# Patient Record
Sex: FEMALE | Race: WHITE | Employment: FULL TIME | URBAN - METROPOLITAN AREA
[De-identification: names, ages, dates, MRNs, and addresses within clinical notes are randomized per-mention and may not be internally consistent; named-entity substitution may affect disease eponyms.]

---

## 2017-01-12 ENCOUNTER — TELEPHONE (OUTPATIENT)
Dept: ORTHOPEDIC SURGERY | Age: 62
End: 2017-01-12

## 2017-10-18 ENCOUNTER — HOSPITAL ENCOUNTER (OUTPATIENT)
Dept: NON INVASIVE DIAGNOSTICS | Age: 62
Discharge: HOME | End: 2017-10-18
Attending: INTERNAL MEDICINE | Admitting: INTERNAL MEDICINE

## 2017-10-18 DIAGNOSIS — R29.90 UNSPECIFIED SYMPTOMS AND SIGNS INVOLVING THE NERVOUS SYSTEM (CODE): ICD-10-CM

## 2017-10-18 LAB
LV EF: 63 %
LVEF MODALITY: NORMAL

## 2019-06-05 ENCOUNTER — TELEPHONE (OUTPATIENT)
Dept: ORTHOPEDIC SURGERY | Age: 64
End: 2019-06-05

## 2020-05-11 ENCOUNTER — OFFICE VISIT (OUTPATIENT)
Dept: ORTHOPEDIC SURGERY | Age: 65
End: 2020-05-11
Payer: COMMERCIAL

## 2020-05-11 VITALS — BODY MASS INDEX: 28.13 KG/M2 | TEMPERATURE: 97.6 F | HEIGHT: 67 IN | WEIGHT: 179.23 LBS

## 2020-05-11 PROCEDURE — 29075 APPL CST ELBW FNGR SHORT ARM: CPT | Performed by: ORTHOPAEDIC SURGERY

## 2020-05-11 PROCEDURE — 99204 OFFICE O/P NEW MOD 45 MIN: CPT | Performed by: ORTHOPAEDIC SURGERY

## 2020-05-11 NOTE — PROGRESS NOTES
5/11/20  History of Present Illness:  Chaparro Gomez is a 63 y.o. female being seen today for left wrist injury she slipped and fell 10 days ago landing on her left wrist    Chief complaint that brought the patient in the office today: Left wrist pain      Location left wrist  Severity moderate  Duration 10 days  Associated sign/symptoms pain, swelling, loss of motion    I have reviewed and discussed the below Pain assessment findings with the patient. Pain Assessment  Location of Pain: Wrist  Location Modifiers: Left  Severity of Pain: 6  Quality of Pain: Throbbing, Sharp  Duration of Pain: Persistent  Frequency of Pain: Constant  Aggravating Factors: Bending, Stretching, Straightening  Limiting Behavior: Yes  Relieving Factors: Rest  Result of Injury: Yes  Work-Related Injury: No  Are there other pain locations you wish to document?: No    Medical History  Patient's medications, allergies, past medical, surgical, social and family histories were reviewed and updated as appropriate. Past Medical History:   Diagnosis Date    Hypertension     Sleep apnea      No family history on file.   Social History     Socioeconomic History    Marital status:      Spouse name: Not on file    Number of children: Not on file    Years of education: Not on file    Highest education level: Not on file   Occupational History    Not on file   Social Needs    Financial resource strain: Not on file    Food insecurity     Worry: Not on file     Inability: Not on file    Transportation needs     Medical: Not on file     Non-medical: Not on file   Tobacco Use    Smoking status: Never Smoker    Smokeless tobacco: Never Used   Substance and Sexual Activity    Alcohol use: No    Drug use: No    Sexual activity: Not on file   Lifestyle    Physical activity     Days per week: Not on file     Minutes per session: Not on file    Stress: Not on file   Relationships    Social connections     Talks on phone: Not on file Gets together: Not on file     Attends Buddhist service: Not on file     Active member of club or organization: Not on file     Attends meetings of clubs or organizations: Not on file     Relationship status: Not on file    Intimate partner violence     Fear of current or ex partner: Not on file     Emotionally abused: Not on file     Physically abused: Not on file     Forced sexual activity: Not on file   Other Topics Concern    Not on file   Social History Narrative    Not on file     Current Outpatient Medications   Medication Sig Dispense Refill    vitamin D (ERGOCALCIFEROL) 19072 units CAPS capsule Take 50,000 Units by mouth once a week      magnesium 30 MG tablet Take 30 mg by mouth nightly       Multiple Vitamins-Minerals (THERAPEUTIC MULTIVITAMIN-MINERALS) tablet Take 1 tablet by mouth daily      Ascorbic Acid (VITAMIN C) 250 MG tablet Take 250 mg by mouth daily      gabapentin (NEURONTIN) 100 MG capsule Take 100 mg by mouth daily      Cholecalciferol (VITAMIN D3) 3000 units TABS Take 1 tablet by mouth nightly      predniSONE (DELTASONE) 10 MG tablet Days1-2:50mg PO QD,Days3-4:40mg PO QD,Days5-6:30mg PO QD,Days7-8:20mg PO QD,Days 9-10:10mg PO QD 30 tablet 0    meloxicam (MOBIC) 15 MG tablet Take 1 tablet by mouth daily 30 tablet 3    Omega-3 Fatty Acids (FISH OIL BURP-LESS PO) Take by mouth daily       aspirin 81 MG chewable tablet Take 81 mg by mouth daily      gabapentin (NEURONTIN) 300 MG capsule Take 500 mg by mouth nightly       Estradiol 10 MCG TABS Place vaginally nightly       venlafaxine (EFFEXOR-XR) 150 MG XR capsule Take 75 mg by mouth nightly       lisinopril (PRINIVIL;ZESTRIL) 10 MG tablet Take 40 mg by mouth daily       ibuprofen (ADVIL;MOTRIN) 600 MG tablet Take 600 mg by mouth every 6 hours as needed. No current facility-administered medications for this visit.       Allergies   Allergen Reactions    Sulfa Antibiotics     Celecoxib Rash    Hydrochlorothiazide Rash REVIEW OF SYSTEMS:   No rashes today  No new numbness  No tingling  No fevers  No depression  No new onset of pain        Pertinent items are noted in HPI  Review of systems reviewed from Patient History Form dated on 5/11/2020 and available in the patient's chart under the Media tab. Examination:    General Exam:    Vitals: Temperature 97.6 °F (36.4 °C), height 5' 7.01\" (1.702 m), weight 179 lb 3.7 oz (81.3 kg), not currently breastfeeding. Constitutional: Patient is adequately groomed with no evidence of malnutrition  Mental Status: The patient is oriented to time, place and person. The patient's mood and affect are appropriate. Lymphatic: The lymphatic examination bilaterally reveals all areas to be without enlargement or induration. Vascular: Examination reveals no swelling or calf tenderness. Peripheral pulses are palpable and 2+. Neurological: The patient has good coordination. There is no weakness or sensory deficit. Skin:    Head/Neck: inspection reveals no rashes, ulcerations or lesions. Trunk:  inspection reveals no rashes, ulcerations or lesions. Right Lower Extremity: inspection reveals no rashes, ulcerations or lesions. Left Lower Extremity: inspection reveals no rashes, ulcerations or lesions. Wrist Examination  Inspection: Inspection shows some abrasion and ecchymosis    Palpation: Moderate to severe pain along the base of the wrist towards the ulnar side    Range of Motion: She has good range of motion lacking a little bit secondary to discomfort    Strength: Good flexion-extension of her fingers no signs of any tendon injury    Special Tests: Radial ulnar and median nerve function is intact capillary refill is brisk sensation is intact negative Tinel's negative Phalen's at the wrist negative Tinel's at the elbow moderate pain to palpation along the hook of the hamate    Skin: There are no rashes, ulcerations or lesions.     Gait:

## 2020-06-04 ENCOUNTER — OFFICE VISIT (OUTPATIENT)
Dept: ORTHOPEDIC SURGERY | Age: 65
End: 2020-06-04
Payer: COMMERCIAL

## 2020-06-04 VITALS — BODY MASS INDEX: 28.09 KG/M2 | WEIGHT: 179 LBS | HEIGHT: 67 IN | HEART RATE: 97 BPM

## 2020-06-04 PROCEDURE — 99214 OFFICE O/P EST MOD 30 MIN: CPT | Performed by: ORTHOPAEDIC SURGERY

## 2020-06-04 PROCEDURE — L3908 WHO COCK-UP NONMOLDE PRE OTS: HCPCS | Performed by: ORTHOPAEDIC SURGERY

## 2020-06-04 NOTE — PROGRESS NOTES
6/4/20  History of Present Illness:  Jamir Jacobs is a 63 y.o. female    Chief complaint today in the office: Recheck evaluation left hook of the hamate fracture    Location left hand and left knee   Severity improving  Duration several weeks  Associated sign/symptoms lateral knee pain, wrist pain is been casted now for 3-1/2 weeks and happened a week and half prior to coming to see us    Medical History  Patient's medications, allergies, past medical, surgical, social and family histories were reviewed and updated as appropriate. I have reviewed and discussed the below Pain assessment findings with the patient. Pain Assessment  Location of Pain: Knee  Location Modifiers: Left  Severity of Pain: 6  Quality of Pain: Dull, Aching  Duration of Pain: Persistent  Frequency of Pain: Constant  Aggravating Factors: Bending, Standing, Walking, Stairs  Limiting Behavior: Yes  Relieving Factors: Rest  Result of Injury: Yes  Work-Related Injury: No  Are there other pain locations you wish to document?: No    Review of Systems  No new rashes  No numbness  No tingling  No fever  No depression  No new pain patterns  Pertinent items are noted in HPI  Review of systems reviewed from Patient History Form dated on 5/11/2020 and available in the patient's chart under the Media tab. No change in the patient's medical history form. Examination:  General Exam:    Vitals: Pulse 97, height 5' 7\" (1.702 m), weight 179 lb (81.2 kg), not currently breastfeeding. BMI Readings from Last 3 Encounters:   06/04/20 28.04 kg/m²   05/11/20 28.07 kg/m²   09/30/17 28.07 kg/m²     Constitutional: Patient is adequately groomed with no evidence of malnutrition  Mental Status: The patient is alert and oriented to time, place and person. The patient's mood and affect are appropriate. Lymphatic: The lymphatic examination bilaterally reveals all areas to be without enlargement or induration.   Vascular: Examination reveals no swelling or calf

## 2020-06-12 ENCOUNTER — TELEPHONE (OUTPATIENT)
Dept: ORTHOPEDIC SURGERY | Age: 65
End: 2020-06-12

## 2020-06-15 ENCOUNTER — TELEPHONE (OUTPATIENT)
Dept: ORTHOPEDIC SURGERY | Age: 65
End: 2020-06-15

## 2020-06-25 ENCOUNTER — OFFICE VISIT (OUTPATIENT)
Dept: ORTHOPEDIC SURGERY | Age: 65
End: 2020-06-25
Payer: COMMERCIAL

## 2020-06-25 VITALS — WEIGHT: 179.01 LBS | BODY MASS INDEX: 28.1 KG/M2 | HEIGHT: 67 IN | TEMPERATURE: 97.8 F

## 2020-06-25 PROCEDURE — 99214 OFFICE O/P EST MOD 30 MIN: CPT | Performed by: ORTHOPAEDIC SURGERY

## 2020-06-25 NOTE — PROGRESS NOTES
mouth nightly       Multiple Vitamins-Minerals (THERAPEUTIC MULTIVITAMIN-MINERALS) tablet Take 1 tablet by mouth daily      Ascorbic Acid (VITAMIN C) 250 MG tablet Take 250 mg by mouth daily      gabapentin (NEURONTIN) 100 MG capsule Take 100 mg by mouth daily      Cholecalciferol (VITAMIN D3) 3000 units TABS Take 1 tablet by mouth nightly      predniSONE (DELTASONE) 10 MG tablet Days1-2:50mg PO QD,Days3-4:40mg PO QD,Days5-6:30mg PO QD,Days7-8:20mg PO QD,Days 9-10:10mg PO QD 30 tablet 0    meloxicam (MOBIC) 15 MG tablet Take 1 tablet by mouth daily 30 tablet 3    Omega-3 Fatty Acids (FISH OIL BURP-LESS PO) Take by mouth daily       aspirin 81 MG chewable tablet Take 81 mg by mouth daily      gabapentin (NEURONTIN) 300 MG capsule Take 500 mg by mouth nightly       Estradiol 10 MCG TABS Place vaginally nightly       venlafaxine (EFFEXOR-XR) 150 MG XR capsule Take 75 mg by mouth nightly       lisinopril (PRINIVIL;ZESTRIL) 10 MG tablet Take 40 mg by mouth daily       ibuprofen (ADVIL;MOTRIN) 600 MG tablet Take 600 mg by mouth every 6 hours as needed. No current facility-administered medications for this visit. Allergies   Allergen Reactions    Sulfa Antibiotics     Celecoxib Rash    Hydrochlorothiazide Rash       REVIEW OF SYSTEMS:   No rash  No numbness  No tingling  No fever  No depression      Pertinent items are noted in HPI  Review of systems reviewed from Patient History Form dated on 6/4/20 and available in the patient's chart under the Media tab. Examination:    General Exam:    Vitals: Temperature 97.8 °F (36.6 °C), height 5' 7.01\" (1.702 m), weight 179 lb 0.2 oz (81.2 kg), not currently breastfeeding. BMI Readings from Last 3 Encounters:   06/25/20 28.03 kg/m²   06/04/20 28.04 kg/m²   05/11/20 28.07 kg/m²     Constitutional: Patient is adequately groomed with no evidence of malnutrition  Mental Status:  The patient is oriented to time, relocation are negative. Anterior and posterior glide are equal bilaterally. Negative sulcus sign. No signs of any significant multidirectional instability. There is no scapular winging. There is no muscle atrophy of the latissimus dorsi, the deltoid, the periscapular musculature, the trapezius musculature or the pectoralis musculature. Negative Neer's test, negative Ahuja test, no pain with crossarm elevation. Abduction --150 degrees  Flexion-- 180 degrees  Extension-- between 45-60 degrees  Latera/external rotation --close to 90 degrees  Medial/ internal rotation -- between 70-90 degree    CERVICAL SPINE  EXAMINATION:  · Inspection: Local inspection shows no step-off or bruising. Cervical alignment is normal. No instability is noted. · Palpation and Percussion: No evidence of tenderness at the midline. Paraspinal tenderness is not present. There is no paraspinal spasm. · Range of Motion:  pain-free ROM   · Strength: 5/5 bilateral upper extremities. · Special Tests:   Spurling's and Acuna's are negative bilaterally. Ahuja and Impingement tests are negative bilaterally. · Skin:There are no rashes, ulcerations or lesions. · Reflexes: Bilaterally triceps, biceps and brachioradialis are 2+. Clonus absent bilaterally at the feet. No pathological reflexes are noted.   · Gait & station:  normal, patient ambulates without assistance and no ataxia      Cranial nerve exam:    1- smell-- patient states no Olfactory problem  2- visual acuity is intact  3- moves eyes, and pupils are reactive  4- extra-ocular muscles are intact  5- facial sensation is intact no muscle atrophy  6- extra ocular muscles are intact  7- mouth moist and facial expressions are intact  8- good hearing and no difficulty with recognition  9- patient has no difficulty swallowing  10- no difficulty breathing and no Gastrointestinal problems good cough   11- moves head with all motion and no swallowing problems  12- normal speech and

## 2020-07-06 ENCOUNTER — OFFICE VISIT (OUTPATIENT)
Dept: ORTHOPEDIC SURGERY | Age: 65
End: 2020-07-06
Payer: COMMERCIAL

## 2020-07-06 VITALS — HEIGHT: 67 IN | TEMPERATURE: 98.6 F | BODY MASS INDEX: 28.1 KG/M2 | WEIGHT: 179.01 LBS

## 2020-07-06 PROBLEM — M17.10 PATELLOFEMORAL ARTHRITIS: Status: ACTIVE | Noted: 2020-07-06

## 2020-07-06 PROBLEM — S83.232A COMPLEX TEAR OF MEDIAL MENISCUS OF LEFT KNEE AS CURRENT INJURY: Status: ACTIVE | Noted: 2020-07-06

## 2020-07-06 PROCEDURE — 99214 OFFICE O/P EST MOD 30 MIN: CPT | Performed by: ORTHOPAEDIC SURGERY

## 2020-07-06 NOTE — PROGRESS NOTES
7/6/20  History of Present Illness:  Cookie Danrell is a 63 y.o. female    Chief complaint today in the office: Recheck evaluation left knee here today to discuss options    Location left knee   Severity moderate  Duration 4 weeks  Associated sign/symptoms pain, swelling, loss of motion    Medical History  Patient's medications, allergies, past medical, surgical, social and family histories were reviewed and updated as appropriate. Review of Systems  No new rashes  No numbness  No tingling  No fever  No depression  No new pain patterns  Pertinent items are noted in HPI  Review of systems reviewed from Patient History Form dated on 7/1/2020 and available in the patient's chart under the Media tab. No change in the patient's medical history form. Examination:  General Exam:    Vitals: Temperature 98.6 °F (37 °C), height 5' 7.01\" (1.702 m), weight 179 lb 0.2 oz (81.2 kg), not currently breastfeeding. BMI Readings from Last 3 Encounters:   07/06/20 28.03 kg/m²   06/25/20 28.03 kg/m²   06/04/20 28.04 kg/m²     Constitutional: Patient is adequately groomed with no evidence of malnutrition  Mental Status: The patient is alert and oriented to time, place and person. The patient's mood and affect are appropriate. Lymphatic: The lymphatic examination bilaterally reveals all areas to be without enlargement or induration. Vascular: Examination reveals no swelling or calf tenderness. Peripheral pulses are palpable and 2+. Neurological: The patient has good coordination. There is no weakness or sensory deficit. Skin:    Head/Neck: inspection reveals no rashes, ulcerations or lesions. Trunk:  inspection reveals no rashes, ulcerations or lesions. Right Lower Extremity: inspection reveals no rashes, ulcerations or lesions. Left Lower Extremity: inspection reveals no rashes, ulcerations or lesions.                                       PHYSICAL EXAM:      Knee Examination  Inspection:  No abrasions no lacerations no signs of infection or obvious deformity moderate obvious swelling and joint effusion. Palpation:   Palpation reveals moderate pain medial joint line,   Moderate lateral joint line pain, moderate joint effusion. Range of Motion: 0-150 degrees flexion/ extension   Hip extension to 20 hip flexion to 70+  Lumbar ROM -20 extension flexion to 6 inches from the floor. Strength: Quadriceps testing 5/5, hamstring muscle testing 5/5, EHL against resistance is 5/5, hip flexor strength is intact 5/5, internal and external rotation of the hip against resistance is also intact 5/5. Special Tests: stable Lachman, negative anterior drawer, negative pivot shift, no posterior sag no posterior drawer does not open to valgus or varus stress at 0 or 30°, Steinmann's is a, Allen's positive, Homans negative Eugenio negative, pedal pulses are +2/4 capillary refill is brisk sensation is intact ankle exam and hip exam are shows no pain with full range of motion provocative testing of the hip is nonpainful muscle testing around the hip is 5 over 5. Lumbar flexion to 6 inches from floor without pain. Gait: antalgic     Reflex: Intact  Lower extremity Deep tendon reflexes +2/4 and equal bilaterally for patella and Achilles. Upper extremity reflexes:  of the biceps, triceps, brachioradialis +2/4 equal bilaterally. Contralateral  Knee: Negative Lachman negative anterior drawer negative pivot shift no posterior sag no posterior drawer does not open to valgus or varus stress at 0 or 30° negative Steinmann's negative Allen's negative Homans negative Eugenio pedal pulses are +2/4 capillary refill is brisk sensation is intact ankle exam and hip exam are shows no pain with full range of motion provocative testing of the hip is nonpainful muscle testing around the hip is 5 over 5. Hip and lumbar testing does not reproduce pain evocative testing does not reproduce symptomatology.       Additional Examinations:  Right Upper Extremity:  Examination of the right upper extremity does not show any tenderness, deformity or injury. Range of motion is unremarkable. There is no gross instability. There are no rashes, ulcerations or lesions. Strength and tone are normal.  Left Upper Extremity: Examination of the left upper extremity does not show any tenderness, deformity or injury. Range of motion is unremarkable. There is no gross instability. There are no rashes, ulcerations or lesions. Strength and tone are normal.  Lower Back: Examination of the lower back does not show any tenderness, deformity or injury. Range of motion is unremarkable. There is no gross instability. There are no rashes, ulcerations or lesions. Strength and tone are normal.    Past Surgical History:   Procedure Laterality Date    JOINT REPLACEMENT      KNEE ARTHROSCOPY      right    SHOULDER ARTHROSCOPY Right 8/14/15    WITH SUBACROMIAL DECOMPRESSION, DISTAL CLAVICLE EXCISION, ROTATOR CUFF REPAIR    WRIST SURGERY Right september 2014    WRIST SURGERY Right february 2015    WRIST SURGERY Left 2012   . Radiology:     X-rays reviewed in office:  I independently reviewed the films in the office today. Views MRI multiple views  Body Part left knee  Impression. Meniscus tear, lateral meniscus tear, patellofemoral chondromalacia    Xr Hand Left (min 3 Views)    Result Date: 6/25/2020  Radiology exam is complete. No Radiologist dictation. Please follow up with ordering provider. Mri Lower Extremity W Jt Wo Contrast    Result Date: 7/1/2020  Site: [facility] #: [id number] #: [id number] Procedure: MR Left Knee w/o Contrast ; Reason for Exam: Left knee pain, rule out medial meniscus tear This document is confidential medical information. Unauthorized disclosure or use of this information is prohibited by law. If you are not the intended recipient of this document, please advise us by calling immediately [phone]. [facility], [address] [place] [address] Patient Name: Valeria Palcaio Case ID: [id number] Patient : 1955 Referring Physician: Bobby Costa DO Exam Date: 2020 Exam Description: MR Left Knee w/o Contrast HISTORY:  Left knee pain. Evaluate for medial meniscus tear. TECHNICAL FACTORS:  Long- and short-axis fat- and water-weighted images were performed. FINDINGS:  High-grade patellofemoral chondromalacia. Extensor mechanism intact. Small effusion. Mild swelling/contusion in the Hoffa fat pad. Low-grade proximal popliteus tendon strain. Fibular collateral ligament and distal biceps femoris tendon intact. Mild swelling or low-grade sprain/grade 1 injury of the MCL complex. Contusion/strain with the suggestion of a 3.5 x 3 x 3.5 mm interstitial tear noted at the tendinous origin of the medial head of the gastrocnemius (sagittal PD fat-sat series 7 image 24). Mild pes anserine bursitis. ACL and PCL intact. Mild spurring at the tibial eminences. An approximately 2 cm long thin inner edge flap tear in the posterior horn of the medial meniscus. Approximately 6 mm inner edge blunting/tear in the posterior horn of the lateral meniscus (sagittal PD fat-sat series 7 images 9 and 10). Intact medial and lateral compartment  articular cartilage. CONCLUSION: 1. 2 cm thin inner edge flap tear in the posterior horn of the medial meniscus. 2. 6 mm inner edge blunting/tear in the posterior horn of the lateral meniscus. 3. Grade 1 MCL injury/mild sprain. 4. Contusion/strain with the suggestion of a 3.5 x 3 x 3.5 mm interstitial tear at the tendinous origin of the medial head of the gastrocnemius. 5. High-grade patellofemoral chondromalacia. Small effusion. Thank you for the opportunity to provide your interpretation. Christopher Murphy MD A: HS/ct 2020 6:08 PM T: CT 2020 5:57 PM         Impression: Medial meniscus tear, lateral meniscus tear, patellofemoral chondromalacia    Office Procedures:  No orders of the defined types were placed in this encounter. Differential Diagnoses: Medial meniscus tear, lateral meniscus tear, patellofemoral chondromalacia, infection, contusion, knee pathology, Muscle injury, bone tumor or stress fracture. Possible other diagnoses: Same as a differential diagnosis      Plan (Medical Decision Making):    I discussed the diagnosis and the treatment options with Paulo Gonzalez today. In Summary:  The various treatment options were outlined and discussed with Paulo Gonzalez including:  Conservative care options: physical therapy, ice, medications, bracing, and activity modification. The indications for therapeutic injections. The indications for additional imaging/laboratory studies. The indications for (possible future) interventions. After considering the various options discussed, Paulo Gonzalez elected to pursue a course of treatment that includes the followin. Medications: No further recommendations for new medications. 2. PT:  Prescribed home exercise program.    3. Further studies: No further studies. 4. Interventional:  We discussed pursuing I spent 15+ minutes, face to face, with the patient discussing and answering questions regarding the risks, benefits, and complications of arthroscopic knee surgery. The patient realizes that there are concerns with this surgery with respect to infection, deep vein thrombosis, neurological injury, delayed  rehabilitation, the possibility of arthrofibrosis of the knee, and specifically  Hoffa's fat pad fibrosis that can potentially cause difficulties. The patient realizes that there are also anesthetic concerns including cardiopulmonary issues, pulmonary issues, and even possibility of death or dystrophy. The patient voiced understanding to this as well as the normal  rehabilitation  that   is involved with weeks of physical therapy, exercise, and strengthening.  The patient also realizes that even though the surgery, from a functional perspective, typically allows the patient to return to good function at about 6 weeks, that it often takes 6 months to completely rehabilitate from this operation. The patient also realizes that if there is an arthritic component to the symptoms, then they may still have some degree of arthritis pain. .  Radiologic imaging and symptoms confirm the pain etiology. Risks, benefits and alternatives of interventional options were discussed. These include and are not limited to bleeding, infection, spinal headache, nerve injury, increased pain and lack of pain relief. The patient verbalized understanding and would like to proceed. The patient will be scheduled accordingly    5. Healthy Lifestyle Measures:  Patient education material reviewing the following was distributed to Juan Butler  Anatomic drawings  Healthy lifestyle education  Advanced imaging preparedness    Proper lifting and carrying techniques,   Weight management discussed  Quitting smoking      6. Follow up:  after surgery      Juan Butler was instructed to call the office if her symptoms worsen or if new symptoms appear prior to the next scheduled visit. She is specifically instructed to contact the office between now & her scheduled appointment if she has concerns related to her condition or if she needs assistance in scheduling the above tests. She is   welcome to call for an appointment sooner if she has any additional concerns or questions. Navin Ross DO    [facility]  Sports Fellowship Trained  Board Certified  [employer] Team Physician      Disclaimer: \"This note was dictated with voice recognition software. Though review and correction are routine, we apologize for any errors. \"

## 2020-07-16 RX ORDER — MELOXICAM 15 MG/1
15 TABLET ORAL DAILY
Qty: 90 TABLET | Refills: 3 | Status: SHIPPED | OUTPATIENT
Start: 2020-07-21

## 2020-07-16 RX ORDER — HYDROCODONE BITARTRATE AND ACETAMINOPHEN 5; 325 MG/1; MG/1
1 TABLET ORAL EVERY 6 HOURS PRN
Qty: 28 TABLET | Refills: 0 | Status: SHIPPED | OUTPATIENT
Start: 2020-07-21 | End: 2020-07-28

## 2020-07-17 ENCOUNTER — OFFICE VISIT (OUTPATIENT)
Dept: PRIMARY CARE CLINIC | Age: 65
End: 2020-07-17
Payer: COMMERCIAL

## 2020-07-17 PROCEDURE — 99211 OFF/OP EST MAY X REQ PHY/QHP: CPT | Performed by: FAMILY MEDICINE

## 2020-07-17 NOTE — PROGRESS NOTES
Keli Holman received a viral test for COVID-19. They were educated on isolation and quarantine as appropriate. For any symptoms, they were directed to seek care from their PCP, given contact information to establish with a doctor, directed to an urgent care or the emergency room. Patient was seen today for pre op Covid testing.

## 2020-07-17 NOTE — PROGRESS NOTES
Name_______________________________________Printed:____________________  Date and time of [id number]_______________________Fzbnrpv Time:__1415 MASC______________   1. The instructions given regarding when and if a patient needs to stop oral intake prior to surgery varies. Follow the specific instructions you were given                  __X_Nothing to eat or to drink after Midnight the night before.                             ____Endoscopy patient follow your DRS instructions-generally you will be doing a part of the prep after Midnight                   ____Carbo loading or ERAS instructions will be given to select patients-if you have been given those instructions -please do the following                           The evening before your surgery after dinner before midnight drink 40 ounces of gatorade. If you are diabetic use sugar free. The morning of surgery drink 40 ounces of water. This needs to be finished 3 hours prior to your surgery start time. 2. Take the following pills with a small sip of water on the morning of surgery___NONE________________________________________________                  Do not take blood pressure medications ending in pril or sartan the micaela prior to surgery or the morning of surgery_   3. Aspirin, Ibuprofen, Advil, Naproxen, Vitamin E and other Anti-inflammatory products and supplements should be stopped for 5 -7days before surgery or as directed by your physician. 4. Check with your Doctor regarding stopping Plavix, Coumadin,Eliquis, Lovenox,Effient,Pradaxa,Xarelto, Fragmin or other blood thinners and follow their instructions. 5. Do not smoke, and do not drink any alcoholic beverages 24 hours prior to surgery. This includes NA Beer. Refrain from the usage of any recreational drugs. 6. You may brush your teeth and gargle the morning of surgery. DO NOT SWALLOW WATER   7.  You MUST make arrangements for a responsible adult to stay on site while you are here and take you home after your surgery. You will not be allowed to leave alone or drive yourself home. It is strongly suggested someone stay with you the first 24 hrs. Your surgery will be cancelled if you do not have a ride home. 8. A parent/legal guardian must accompany a child scheduled for surgery and plan to stay at the hospital until the child is discharged. Please do not bring other children with you. 9. Please wear simple, loose fitting clothing to the hospital.  Ramu Salter not bring valuables (money, credit cards, checkbooks, etc.) Do not wear any makeup (including no eye makeup) or nail polish on your fingers or toes. 10. DO NOT wear any jewelry or piercings on day of surgery. All body piercing jewelry must be removed. 11. If you have ___dentures, they will be removed before going to the OR; we will provide you a container. If you wear ___contact lenses or ___glasses, they will be removed; please bring a case for them. 12. Please see your family doctor/pediatrician for a history & physical and/or concerning medications. Bring any test results/reports from your physician's office. PCP__________________Phone___________H&P Appt. Date________             13 If you  have a Living Will and Durable Power of  for Healthcare, please bring in a copy. 15. Notify your Surgeon if you develop any illness between now and surgery  time, cough, cold, fever, sore throat, nausea, vomiting, etc.  Please notify your surgeon if you experience dizziness, shortness of breath or blurred vision between now & the time of your surgery             15. DO NOT shave your operative site 96 hours prior to surgery. For face & neck surgery, men may use an electric razor 48 hours prior to surgery. 16. Shower the night before or morning of surgery using an antibacterial soap or as you have been instructed.              17. To provide excellent care visitors will be limited to one in the room at any given time. 18.  Please bring picture ID and insurance card. 19.  Visit our web site for additional information:  [email or web address]              20.During flu season no children under the age of 15 are permitted in the hospital for the safety of all patients. 21. If you take a long acting insulin in the evening only  take half of your usual  dose the night  before your procedure              22. If you use a c-pap please bring DOS if staying overnight,             23.For your convenience [facility] has a pharmacy on site to fill your prescriptions. 24. If you use oxygen and have a portable tank please bring it  with you the DOS             25. Bring a complete list of all your medications with name and dose include any supplements. 26. Other__Patient instructed to get their COVID-19 test done as directed by their doctor (5-7 days prior to procedure)  or patient states will get on __________. I The day the COVID test is done is considered day one. Instructed to self quarantine after test until DOS. _______There is a one visitor policy at [facility] for the pre-op phase only for surgery and endoscopy cases. The visitor is expected to leave the facility after the patient is taken back for the procedure. Pain management is NO VISITOR policyThe patients ride is expected to remain in the car with a cell phone for communication. If the ride is leaving the hospital grounds please make sure they are back in time for pickup. Have the patient inform the staff on arrival what their rides plans are while the patient is in the facility. At the MAIN there is one visitor allowed. Please note that the visitor policy is subject to change._________________________________   *Please call pre admission testing if you any further questions   Adilene Montaño [address]    [phone]  Francesco Robertson  [phone]   [facility] [address]ANDT [phone]       All above information reviewed with patient in person or by phone. Patient verbalizes understanding. All questions and concerns addressed.                                                                                                  Patient/Rep____________________                                                                                                                                    PRE OP INSTRUCTIONS

## 2020-07-17 NOTE — PROGRESS NOTES
PT INSISTING THAT SHE CAN'T GO WITHOUT DRINKING WATER FROM MIDNIGHT. DR Ashley Pierre SAID SHE CAN HAVE CLEAR LIQUIDS UNTIL 10 AM ON DOS UNLESS HER SURGERY TIME CHANGES. SHE MUST BE NPO FOR 6 HOURS. PT NOTIFIED RE SAME. SHE VERBALIZED UNDERSTANDING.

## 2020-07-17 NOTE — PATIENT INSTRUCTIONS

## 2020-07-20 ENCOUNTER — TELEPHONE (OUTPATIENT)
Dept: ORTHOPEDIC SURGERY | Age: 65
End: 2020-07-20

## 2020-07-20 LAB — SARS-COV-2: NOT DETECTED

## 2020-07-20 NOTE — TELEPHONE ENCOUNTER
Stevesuman Cooper [id number]    Date: 07/21/2020  Type of SX:  OP  Location: [facility]  CPT: 37118   DX Code: K29.065V  SX area: Left knee scope  Insurance: [email or web address]

## 2020-07-21 ENCOUNTER — HOSPITAL ENCOUNTER (OUTPATIENT)
Age: 65
Setting detail: OUTPATIENT SURGERY
Discharge: HOME | End: 2020-07-21
Attending: ORTHOPAEDIC SURGERY | Admitting: ORTHOPAEDIC SURGERY
Payer: COMMERCIAL

## 2020-07-21 ENCOUNTER — ANESTHESIA (OUTPATIENT)
Dept: OPERATING ROOM | Age: 65
End: 2020-07-21
Payer: COMMERCIAL

## 2020-07-21 ENCOUNTER — ANESTHESIA EVENT (OUTPATIENT)
Dept: OPERATING ROOM | Age: 65
End: 2020-07-21
Payer: COMMERCIAL

## 2020-07-21 VITALS
HEART RATE: 71 BPM | SYSTOLIC BLOOD PRESSURE: 156 MMHG | DIASTOLIC BLOOD PRESSURE: 79 MMHG | TEMPERATURE: 97.4 F | BODY MASS INDEX: 26.57 KG/M2 | HEIGHT: 66 IN | RESPIRATION RATE: 12 BRPM | OXYGEN SATURATION: 97 % | WEIGHT: 165.31 LBS

## 2020-07-21 VITALS
SYSTOLIC BLOOD PRESSURE: 117 MMHG | RESPIRATION RATE: 14 BRPM | DIASTOLIC BLOOD PRESSURE: 55 MMHG | OXYGEN SATURATION: 100 %

## 2020-07-21 PROCEDURE — 7100000000 HC PACU RECOVERY - FIRST 15 MIN: Performed by: ORTHOPAEDIC SURGERY

## 2020-07-21 PROCEDURE — 3700000001 HC ADD 15 MINUTES (ANESTHESIA): Performed by: ORTHOPAEDIC SURGERY

## 2020-07-21 PROCEDURE — 7100000011 HC PHASE II RECOVERY - ADDTL 15 MIN: Performed by: ORTHOPAEDIC SURGERY

## 2020-07-21 PROCEDURE — 3600000004 HC SURGERY LEVEL 4 BASE: Performed by: ORTHOPAEDIC SURGERY

## 2020-07-21 PROCEDURE — 6360000002 HC RX W HCPCS: Performed by: NURSE ANESTHETIST, CERTIFIED REGISTERED

## 2020-07-21 PROCEDURE — 2709999900 HC NON-CHARGEABLE SUPPLY: Performed by: ORTHOPAEDIC SURGERY

## 2020-07-21 PROCEDURE — 6370000000 HC RX 637 (ALT 250 FOR IP): Performed by: ANESTHESIOLOGY

## 2020-07-21 PROCEDURE — 2720000010 HC SURG SUPPLY STERILE: Performed by: ORTHOPAEDIC SURGERY

## 2020-07-21 PROCEDURE — 2580000003 HC RX 258: Performed by: ANESTHESIOLOGY

## 2020-07-21 PROCEDURE — 2500000003 HC RX 250 WO HCPCS: Performed by: NURSE ANESTHETIST, CERTIFIED REGISTERED

## 2020-07-21 PROCEDURE — 7100000010 HC PHASE II RECOVERY - FIRST 15 MIN: Performed by: ORTHOPAEDIC SURGERY

## 2020-07-21 PROCEDURE — 6360000002 HC RX W HCPCS: Performed by: ORTHOPAEDIC SURGERY

## 2020-07-21 PROCEDURE — 2580000003 HC RX 258: Performed by: ORTHOPAEDIC SURGERY

## 2020-07-21 PROCEDURE — 3700000000 HC ANESTHESIA ATTENDED CARE: Performed by: ORTHOPAEDIC SURGERY

## 2020-07-21 PROCEDURE — 3600000014 HC SURGERY LEVEL 4 ADDTL 15MIN: Performed by: ORTHOPAEDIC SURGERY

## 2020-07-21 PROCEDURE — 2500000003 HC RX 250 WO HCPCS: Performed by: ORTHOPAEDIC SURGERY

## 2020-07-21 PROCEDURE — 7100000001 HC PACU RECOVERY - ADDTL 15 MIN: Performed by: ORTHOPAEDIC SURGERY

## 2020-07-21 RX ORDER — MEPERIDINE HYDROCHLORIDE 25 MG/ML
12.5 INJECTION INTRAMUSCULAR; INTRAVENOUS; SUBCUTANEOUS EVERY 5 MIN PRN
Status: DISCONTINUED | OUTPATIENT
Start: 2020-07-21 | End: 2020-07-21 | Stop reason: HOSPADM

## 2020-07-21 RX ORDER — ONDANSETRON 2 MG/ML
4 INJECTION INTRAMUSCULAR; INTRAVENOUS
Status: DISCONTINUED | OUTPATIENT
Start: 2020-07-21 | End: 2020-07-21 | Stop reason: HOSPADM

## 2020-07-21 RX ORDER — FENTANYL CITRATE 50 UG/ML
INJECTION, SOLUTION INTRAMUSCULAR; INTRAVENOUS PRN
Status: DISCONTINUED | OUTPATIENT
Start: 2020-07-21 | End: 2020-07-21 | Stop reason: SDUPTHER

## 2020-07-21 RX ORDER — PROPOFOL 10 MG/ML
INJECTION, EMULSION INTRAVENOUS PRN
Status: DISCONTINUED | OUTPATIENT
Start: 2020-07-21 | End: 2020-07-21 | Stop reason: SDUPTHER

## 2020-07-21 RX ORDER — ONDANSETRON 2 MG/ML
INJECTION INTRAMUSCULAR; INTRAVENOUS PRN
Status: DISCONTINUED | OUTPATIENT
Start: 2020-07-21 | End: 2020-07-21 | Stop reason: SDUPTHER

## 2020-07-21 RX ORDER — HYDRALAZINE HYDROCHLORIDE 20 MG/ML
5 INJECTION INTRAMUSCULAR; INTRAVENOUS EVERY 10 MIN PRN
Status: DISCONTINUED | OUTPATIENT
Start: 2020-07-21 | End: 2020-07-21 | Stop reason: HOSPADM

## 2020-07-21 RX ORDER — VIT C/B6/B5/MAGNESIUM/HERB 173 50-5-6-5MG
1 CAPSULE ORAL
COMMUNITY

## 2020-07-21 RX ORDER — HYDROCODONE BITARTRATE AND ACETAMINOPHEN 5; 325 MG/1; MG/1
1 TABLET ORAL
Status: COMPLETED | OUTPATIENT
Start: 2020-07-21 | End: 2020-07-21

## 2020-07-21 RX ORDER — OXYCODONE HYDROCHLORIDE AND ACETAMINOPHEN 5; 325 MG/1; MG/1
1 TABLET ORAL
Status: DISCONTINUED | OUTPATIENT
Start: 2020-07-21 | End: 2020-07-21 | Stop reason: HOSPADM

## 2020-07-21 RX ORDER — DEXAMETHASONE SODIUM PHOSPHATE 4 MG/ML
INJECTION, SOLUTION INTRA-ARTICULAR; INTRALESIONAL; INTRAMUSCULAR; INTRAVENOUS; SOFT TISSUE PRN
Status: DISCONTINUED | OUTPATIENT
Start: 2020-07-21 | End: 2020-07-21 | Stop reason: SDUPTHER

## 2020-07-21 RX ORDER — CHLORAL HYDRATE 500 MG
1000 CAPSULE ORAL 3 TIMES DAILY
COMMUNITY

## 2020-07-21 RX ORDER — HYDROMORPHONE HCL 110MG/55ML
0.5 PATIENT CONTROLLED ANALGESIA SYRINGE INTRAVENOUS EVERY 5 MIN PRN
Status: DISCONTINUED | OUTPATIENT
Start: 2020-07-21 | End: 2020-07-21 | Stop reason: HOSPADM

## 2020-07-21 RX ORDER — PROPOFOL 10 MG/ML
INJECTION, EMULSION INTRAVENOUS CONTINUOUS PRN
Status: DISCONTINUED | OUTPATIENT
Start: 2020-07-21 | End: 2020-07-21 | Stop reason: SDUPTHER

## 2020-07-21 RX ORDER — LABETALOL HYDROCHLORIDE 5 MG/ML
5 INJECTION, SOLUTION INTRAVENOUS EVERY 10 MIN PRN
Status: DISCONTINUED | OUTPATIENT
Start: 2020-07-21 | End: 2020-07-21 | Stop reason: HOSPADM

## 2020-07-21 RX ORDER — LIDOCAINE HYDROCHLORIDE 20 MG/ML
INJECTION, SOLUTION EPIDURAL; INFILTRATION; INTRACAUDAL; PERINEURAL PRN
Status: DISCONTINUED | OUTPATIENT
Start: 2020-07-21 | End: 2020-07-21 | Stop reason: SDUPTHER

## 2020-07-21 RX ORDER — SODIUM CHLORIDE 9 MG/ML
INJECTION, SOLUTION INTRAVENOUS CONTINUOUS
Status: DISCONTINUED | OUTPATIENT
Start: 2020-07-21 | End: 2020-07-21 | Stop reason: HOSPADM

## 2020-07-21 RX ADMIN — FENTANYL CITRATE 25 MCG: 50 INJECTION, SOLUTION INTRAMUSCULAR; INTRAVENOUS at 16:14

## 2020-07-21 RX ADMIN — FENTANYL CITRATE 50 MCG: 50 INJECTION, SOLUTION INTRAMUSCULAR; INTRAVENOUS at 16:04

## 2020-07-21 RX ADMIN — ONDANSETRON 4 MG: 2 INJECTION INTRAMUSCULAR; INTRAVENOUS at 16:10

## 2020-07-21 RX ADMIN — LIDOCAINE HYDROCHLORIDE 40 MG: 20 INJECTION, SOLUTION EPIDURAL; INFILTRATION; INTRACAUDAL; PERINEURAL at 16:04

## 2020-07-21 RX ADMIN — PROPOFOL 150 MCG/KG/MIN: 10 INJECTION, EMULSION INTRAVENOUS at 16:04

## 2020-07-21 RX ADMIN — FENTANYL CITRATE 25 MCG: 50 INJECTION, SOLUTION INTRAMUSCULAR; INTRAVENOUS at 16:21

## 2020-07-21 RX ADMIN — CEFAZOLIN SODIUM 2 G: 10 INJECTION, POWDER, FOR SOLUTION INTRAVENOUS at 15:49

## 2020-07-21 RX ADMIN — DEXAMETHASONE SODIUM PHOSPHATE 4 MG: 4 INJECTION, SOLUTION INTRAMUSCULAR; INTRAVENOUS at 16:10

## 2020-07-21 RX ADMIN — HYDROCODONE BITARTRATE AND ACETAMINOPHEN 1 TABLET: 5; 325 TABLET ORAL at 17:26

## 2020-07-21 RX ADMIN — SODIUM CHLORIDE: 9 INJECTION, SOLUTION INTRAVENOUS at 15:01

## 2020-07-21 RX ADMIN — PROPOFOL 50 MG: 10 INJECTION, EMULSION INTRAVENOUS at 16:04

## 2020-07-21 ASSESSMENT — PULMONARY FUNCTION TESTS
PIF_VALUE: 0
PIF_VALUE: 1
PIF_VALUE: 0
PIF_VALUE: 1
PIF_VALUE: 0
PIF_VALUE: 1
PIF_VALUE: 1
PIF_VALUE: 0
PIF_VALUE: 0
PIF_VALUE: 1
PIF_VALUE: 0
PIF_VALUE: 1
PIF_VALUE: 0
PIF_VALUE: 0
PIF_VALUE: 1
PIF_VALUE: 0
PIF_VALUE: 0
PIF_VALUE: 1
PIF_VALUE: 0
PIF_VALUE: 2
PIF_VALUE: 1
PIF_VALUE: 0
PIF_VALUE: 1

## 2020-07-21 ASSESSMENT — PAIN SCALES - GENERAL: PAINLEVEL_OUTOF10: 5

## 2020-07-21 ASSESSMENT — PAIN DESCRIPTION - DESCRIPTORS: DESCRIPTORS: ACHING

## 2020-07-21 ASSESSMENT — PAIN - FUNCTIONAL ASSESSMENT
PAIN_FUNCTIONAL_ASSESSMENT: 0-10
PAIN_FUNCTIONAL_ASSESSMENT: PREVENTS OR INTERFERES SOME ACTIVE ACTIVITIES AND ADLS

## 2020-07-21 ASSESSMENT — PAIN DESCRIPTION - ORIENTATION: ORIENTATION: LEFT

## 2020-07-21 ASSESSMENT — PAIN DESCRIPTION - LOCATION: LOCATION: KNEE

## 2020-07-21 ASSESSMENT — PAIN DESCRIPTION - PAIN TYPE: TYPE: SURGICAL PAIN

## 2020-07-21 NOTE — PROGRESS NOTES
This RN verified with [facility] pharmacist, that patient is able to safely take prescribed meloxicam with history of celecoxib allergy.     Electronically signed by Obinna Orona RN on 7/21/2020 at 5:25 PM

## 2020-07-21 NOTE — ANESTHESIA PRE PROCEDURE
HYDROcodone-acetaminophen (NORCO) 5-325 MG per tablet Take 1 tablet by mouth every 6 hours as needed for Pain for up to 7 days. 28 tablet 0    magnesium 30 MG tablet Take 30 mg by mouth nightly       Multiple Vitamins-Minerals (THERAPEUTIC MULTIVITAMIN-MINERALS) tablet Take 1 tablet by mouth daily      Ascorbic Acid (VITAMIN C) 250 MG tablet Take 250 mg by mouth daily      Cholecalciferol (VITAMIN D3) 3000 units TABS Take 1 tablet by mouth nightly      predniSONE (DELTASONE) 10 MG tablet Days1-2:50mg PO QD,Days3-4:40mg PO QD,Days5-6:30mg PO QD,Days7-8:20mg PO QD,Days 9-10:10mg PO QD (Patient not taking: Reported on 7/17/2020) 30 tablet 0    venlafaxine (EFFEXOR-XR) 150 MG XR capsule Take 75 mg by mouth nightly       lisinopril (PRINIVIL;ZESTRIL) 10 MG tablet Take 20 mg by mouth daily       ibuprofen (ADVIL;MOTRIN) 600 MG tablet Take 600 mg by mouth every 6 hours as needed. Allergies:     Allergies   Allergen Reactions    Amoxicillin-Pot Clavulanate Diarrhea    Celecoxib Rash    Hydrochlorothiazide Rash    Sulfa Antibiotics Rash     celebrex caused a rash       Problem List:    Patient Active Problem List   Diagnosis Code    Sprain of wrist A38.105O    Fracture of triquetrum of right wrist, closed S62.111A    Closed fracture of distal end of ulna (alone) S52.609A    Articular cartilage disorder of forearm M24.139    Ganglion of joint M67.40    Localized superficial swelling, mass, or lump R22.9    Shoulder pain M25.519    Rotator cuff tear M75.100    Rotator cuff (capsule) sprain S43.429A    Complete tear of right rotator cuff M75.121    Right shoulder pain M25.511    Transient cerebral ischemia G45.9    Essential hypertension I10    Obstructive sleep apnea G47.33    Patellofemoral arthritis M17.10    Complex tear of medial meniscus of left knee as current injury S84.626H       Past Medical History:        Diagnosis Date    Hypertension     Sleep apnea     uses cpap       Past Surgical History:        Procedure Laterality Date    JOINT REPLACEMENT      KNEE ARTHROSCOPY      right    SHOULDER ARTHROSCOPY Right 8/14/15    WITH SUBACROMIAL DECOMPRESSION, DISTAL CLAVICLE EXCISION, ROTATOR CUFF REPAIR    WRIST SURGERY Right september 2014    WRIST SURGERY Right february 2015    WRIST SURGERY Left 2012       Social History:    Social History     Tobacco Use    Smoking status: Never Smoker    Smokeless tobacco: Never Used   Substance Use Topics    Alcohol use: No     Comment: rare                                Counseling given: Not Answered      Vital Signs (Current):   Vitals:    07/17/20 1041   Weight: 165 lb (74.8 kg)   Height: 5' 6\" (1.676 m)                                              BP Readings from Last 3 Encounters:   09/30/17 128/77   08/14/15 141/67   06/22/15 100/70       NPO Status:                                                                                 BMI:   Wt Readings from Last 3 Encounters:   07/17/20 165 lb (74.8 kg)   07/06/20 179 lb 0.2 oz (81.2 kg)   06/25/20 179 lb 0.2 oz (81.2 kg)     Body mass index is 26.63 kg/m². CBC:   Lab Results   Component Value Date    WBC 8.1 09/29/2017    RBC 4.29 09/29/2017    HGB 13.4 09/29/2017    HCT 39.9 09/29/2017    MCV 92.9 09/29/2017    RDW 12.8 09/29/2017     09/29/2017       CMP:   Lab Results   Component Value Date     09/29/2017    K 4.0 09/29/2017     09/29/2017    CO2 27 09/29/2017    BUN 16 09/29/2017    CREATININE 0.6 09/29/2017    GFRAA >60 09/29/2017    LABGLOM >60 09/29/2017    GLUCOSE 108 09/29/2017    CALCIUM 9.8 09/29/2017       POC Tests: No results for input(s): POCGLU, POCNA, POCK, POCCL, POCBUN, POCHEMO, POCHCT in the last 72 hours.     Coags: No results found for: PROTIME, INR, APTT    HCG (If Applicable):   Lab Results   Component Value Date    PREGTESTUR Neg 09/20/2010        ABGs: No results found for: PHART, PO2ART, YEK6PTP, ZGE8VGY, BEART, I9VKWCQN     Type & Screen (If Applicable):  No results found for: LABABO, LABRH    Drug/Infectious Status (If Applicable):  No results found for: HIV, HEPCAB    COVID-19 Screening (If Applicable):   Lab Results   Component Value Date    COVID19 NOT DETECTED 07/17/2020         Anesthesia Evaluation  Patient summary reviewed and Nursing notes reviewed  Airway: Mallampati: II  TM distance: >3 FB   Neck ROM: full  Mouth opening: > = 3 FB Dental:          Pulmonary:   (+) sleep apnea: on CPAP,                             Cardiovascular:  Exercise tolerance: good (>4 METS),   (+) hypertension: moderate,                   Neuro/Psych:               GI/Hepatic/Renal:             Endo/Other:                     Abdominal:           Vascular:                                        Anesthesia Plan      general     ASA 1     (Pt on prednisone)  Induction: intravenous and rapid sequence. MIPS: Postoperative opioids intended, Prophylactic antiemetics administered and Postoperative trial extubation. Anesthetic plan and risks discussed with patient. Plan discussed with CRNA.                   Marco Aguilera MD   7/21/2020

## 2020-07-21 NOTE — PROGRESS NOTES
Patient up to bathroom to urinate, tolerated well. Patient has crutches and two prescriptions.  Enma Cummings, son in law, taking patient home in stable condition    Electronically signed by Francie Melendez RN on 7/21/2020 at 3240

## 2020-07-21 NOTE — PROGRESS NOTES
Patient's awake and alert. On room air. NSR on the monitor. VSS. drsg to left leg CDI. Skin warm, brisk cap refill, pedal pulse palpable. Pain tolerable, rating 5/10. Patient tolerating PO, denies nausea. Patient meets criteria to be discharged from phase 1.  Will prepare for discharge home in phase 2    Electronically signed by Obinna Orona RN on 7/21/2020 at 1700

## 2020-07-21 NOTE — PROGRESS NOTES
Discharge instructions went over with patient and patient's daughter, Maria Guadalupe Flaherty    Electronically signed by Kenyon Shrestha RN on 7/21/2020 at 5:43 PM

## 2020-07-21 NOTE — OP NOTE
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       Procedure(s):  LEFT KNEE ARTHROSCOPE, PARTIAL MEDIAL AND LATERAL MENISCECTOMY VERSUS REPAIR AND INDICATED PROCEDURES    Surgeon(s):  Pasquale Espinoza DO    Assistant:   * No surgical staff found *    Anesthesia: Monitor Anesthesia Care    Estimated Blood Loss (mL): Minimal    Complications: None    Specimens:   * No specimens in log *    Implants:  * No surgical log found *      Drains: * No LDAs found *    Findings: Medial and lateral meniscus tears, synovitis, osteoarthritis    Detailed Description of Procedure:   Left knee diagnostic arthroscopy with partial medial meniscectomy, partial lateral meniscectomy, synovectomy, chondroplasty    Electronically signed by Pasquale Espinoza DO on 7/21/2020 at 10:04 AM                               [address], [address] [place] [address]       Operative note by  Mary Barnett. Juan Maldonado MS, DO  Orthopedic surgeon  Orthopedics sports Fellowship trained  Board-certified  Team Physician for [employer]                       Knee Arthroscopy      Patient Name:  Briana Weaver    YOB: 1955    Medical  Record number: [id number]    Account number: [de-identified]     Date Of Surgery: 7/21/2020    Date Of Dictation: 7/21/2020    Location: [address]    Surgeon: Dr. Oliver Sicard    Assistant: None    Anesthesia: Local with General    Indications:  Chronic pain not alleviated by conservative therapy, positive MRI, not improved with conservative care    Complications: None    Estimated blood loss: Minimal    Preoperative antibiotics: Given and documented in the chart        Preoperative diagnosis :  1.  Left knee medial meniscus tear  2. Left knee lateral meniscus tear            Postoperative diagnosis :  1.  Left knee medial meniscus tear  2. Left knee lateral meniscus tear  3. Left knee hyperemic hypertrophic synovitis  4. Left knee chondromalacia the patella, trochlea, medial femoral condyle          Procedure performed:  1. Left knee diagnostic arthroscopy  2. Left knee arthroscopic partial medial meniscectomy  3. Left knee arthroscopic partial lateral meniscectomy  4. Left knee hyperemic hypertrophic synovectomy   5.  left knee chondroplasty the patella, trochlea, medial femoral condyle        A separate arthroscopy of the medial joint space compartment (which included the medial femoral condyle the medial tibial plateau and medial meniscus ) procedure performed was synovectomy, partial medial meniscectomy and chondroplasty. A separate arthroscopy of the lateral joint space compartment (which includes the the lateral femoral condyle the lateral tibial plateau the lateral meniscus popliteus and portions of the anterior cruciate ligament ) procedure performed was synovectomy, partial lateral meniscectomy. A separate arthroscopy of the anterior joint space compartment (which included the anterior fat pad anterior to the medial and lateral compartment anterior to the medial and lateral gutter anterior to the intercondylar notch inferior to the patella trochlea articulation ) with procedure performed synovectomy. A separate arthroscopy of the posterior joint space compartment (behind the ACL, behind the PCL, posterior joint space behind the medial meniscus and posterior joint space behind the lateral meniscus including the likely area of a Baker's cyst ) with procedure performed synovectomy.     A separate arthroscopy of the superior patella pouch compartment (which includes the superior patellar pouch medial to the medial gutter and superior to the medial gutter lateral to the lateral gutter and superior to the lateral gutter from the patella trochlea area all the way to the top of the knee joint )  with procedure performed synovectomy. A separate arthroscopy of the medial gutter compartment (from the anterior medial joint space medial to the medial joint space anterior to the posterior joint space and below the superior patellar pouch and trochlea patella area.  )  With procedure performed synovectomy. A separate arthroscopy of the lateral gutter compartment with procedure performed synovectomy. A separate arthroscopy of the intercondylar notch compartment with procedure performed synovectomy chondroplasty. A separate arthroscopy of the patella and trochlea articulation compartment and area with procedure performed synovectomy. Procedure in detail:  Patient was seen and evaluated A history and physical was obtained a written consent was discussed and signed by the patient. Following the anesthesia evaluation and discussion with the patient about the scheduled procedure and recovery along with postoperative follow-up plans the patient was brought back to the operative suite put on the operative table in the supine position. At this point the patient was given a MAC anesthetic. I personally scrubbed the knee with alcohol and Betadine and injected the joint with 60 mL total 30 mL of Marcaine 30 mL of lidocaine the lidocaine did have epinephrine using an 18-gauge 1-1/2 inch needle. The knee was put through a full range of motion and tested for stability with Lachman and anterior drawer pivot shift I also performed a posterior drawer assessed stability to valgus and varus stress at 0 and 30°.        Following the evaluation and injection the knee was scrubbed with DuraPrep from upper thigh where we had put a U drape on the way through the foot. Then using sterile technique we draped the leg in a sterile manner. The inferior lateral portal was made using a sharp scalpel and a blunt trocar. The camera was slipped into the cannula suction and irrigation was hoped and started on the cannula. Once the fluid was running we could see the joint very nicely it was irrigated copiously to remove all loose debris and get a nice clear 4 K picture. A diagnostic arthroscopy was performed to evaluate the superior patellar pouch, the medial gutter, the lateral gutter, anterior medial joint space the anterior lateral joint space both meniscuses were probed medially and laterally using the figure-of-four for the lateral joint space using the lateral bar for the medial joint space the ACL and PCL were probed and the chondral surface was evaluated and probed. The camera was initially put in the lateral portal and a shaver was brought into the medial portal using the shaver I remove the synovial tissue from the superior patellar pouch and then used the bipolar to cauterize any bleeding tissue. From the same portal I remove the synovium from the medial gutter patella trochlear area the anterior medial joint space part of the anterior lateral joint space in the intertrochlear area using both the shaver and the bipolar. I now changed the camera to the medial portal using the shaver in the lateral portal I remove the remainder of the synovium from the anterior joint space the anterior lateral joint space in the lateral gutter all the way back up to the superior patellar pouch and across into the patella trochlear area once this was all excised we used the bipolar to smooth off any tissue and cauterize any bleeding tissue. The chondral surface had loose hanging debris. A chondroplasty was performed.    The chondral surface was was trimmed with a shaver down to a stable surface and then I used

## 2020-07-21 NOTE — ANESTHESIA POSTPROCEDURE EVALUATION
Department of Anesthesiology  Postprocedure Note    Patient: Landon Goldmann  MRN: [id number]  YOB: 1955  Date of evaluation: 7/21/2020  Time:  4:44 PM     Procedure Summary     Date:  07/21/20 Room / Location:  [address]    Anesthesia Start:  1601 Anesthesia Stop:      Procedure:  LEFT KNEE ARTHROSCOPY, PARTIAL MEDIAL AND LATERAL MENISCECTOMY,SYNOVECTOMY, CHONDROPLASTY PATELLA (Left Knee) Diagnosis:  (Y93.986D  LEFT KNEE MEDIAL MENISCUS TEAR)    Surgeon:  Bibi De Guzman DO Responsible Provider:  Ar Shirley MD    Anesthesia Type:  general ASA Status:  1          Anesthesia Type: general    Cristo Phase I: Cristo Score: 10    Cristo Phase II:      Last vitals: Reviewed and per EMR flowsheets.        Anesthesia Post Evaluation    Patient location during evaluation: PACU  Patient participation: complete - patient participated  Level of consciousness: awake  Airway patency: patent  Nausea & Vomiting: no vomiting and no nausea  Complications: no  Cardiovascular status: hemodynamically stable  Respiratory status: acceptable  Hydration status: stable

## 2020-07-21 NOTE — PROGRESS NOTES
Patient arrived from OR to PACU. On room air. NSR on the monitor. VSS. drsg to left leg CDI. Skin warm, brisk cap refill, pedal pulse palpable. Ice applied.     Electronically signed by Tu Venegas RN on 7/21/2020 at 6192

## 2020-08-06 ENCOUNTER — OFFICE VISIT (OUTPATIENT)
Dept: ORTHOPEDIC SURGERY | Age: 65
End: 2020-08-06

## 2020-08-06 VITALS — WEIGHT: 165 LBS | TEMPERATURE: 97.5 F | BODY MASS INDEX: 26.52 KG/M2 | HEIGHT: 66 IN

## 2020-08-06 PROCEDURE — 99024 POSTOP FOLLOW-UP VISIT: CPT | Performed by: ORTHOPAEDIC SURGERY

## 2020-08-06 NOTE — PROGRESS NOTES
8/6/20  HISTORY OF PRESENT ILLNESS:   S/P Knee Arthroscopy  The Patient returns today for their postoperative visit after 7/21/2020 left knee arthroscopy. Pain control has been satisfactory with oral medications. There have been no fevers or chills. PHYSICAL EXAMINATION: Left knee  Inspection reveals expected swelling. Portal sites are clean and dry. The skin is warm. Range of motion is not limited by pain and swelling. There is no calf pain  Homans sign is negative. Examination of the contralateral knee reveals warm skin, range of motion within normal limits, good quadriceps bulk, tone and strength, no tenderness to palpation, stable cruciate and collateral ligaments, and no joint line tenderness. Examination of the Patients Lumbar spine reveals the skin is warm and dry. There is no swelling, warmth, or erythema. Range of motion is within normal limits. There is no paraspinal or spinous process tenderness. Ipsilateral and contralateral straight leg raising tests are negative. The distal neurovascular exam is grossly intact and symmetric. ASSESSMENT/PLAN:   The patient is doing well after knee arthroscopy. I have recommended ice, judicious use of NSAIDs, and physical therapy to diminish swelling and restore both motion and strength. I cautioned against overusing the knee, and they will schedule a reevaluation in 4 to 6 weeks  They verbalized good understanding of the plan. Pedro Allen D.O. [address] [place] [address] and Sports Medicine  Sports Fellowship Trained  Board Certified  [employer] Team Physician      Disclaimer: \"This note was dictated with voice recognition software. Though review and correction are routine, we apologize for any errors. \"

## 2020-09-03 ENCOUNTER — OFFICE VISIT (OUTPATIENT)
Dept: ORTHOPEDIC SURGERY | Age: 65
End: 2020-09-03

## 2020-09-03 VITALS — WEIGHT: 165 LBS | TEMPERATURE: 97.7 F | HEIGHT: 66 IN | BODY MASS INDEX: 26.52 KG/M2

## 2020-09-03 PROCEDURE — 99024 POSTOP FOLLOW-UP VISIT: CPT | Performed by: ORTHOPAEDIC SURGERY

## 2020-09-03 NOTE — PROGRESS NOTES
9/3/20  HISTORY OF PRESENT ILLNESS:   S/P Knee Arthroscopy  The Patient returns today for their postoperative visit after 7/21/2020 left knee arthroscopy. Pain control has been satisfactory with oral medications. There have been no fevers or chills. PHYSICAL EXAMINATION: Left knee  Inspection reveals expected swelling. Portal sites are clean and dry. The skin is warm. Range of motion is not limited by pain and swelling. There is no calf pain  Homans sign is negative. Examination of the contralateral knee reveals warm skin, range of motion within normal limits, good quadriceps bulk, tone and strength, no tenderness to palpation, stable cruciate and collateral ligaments, and no joint line tenderness. Examination of the Patients Lumbar spine reveals the skin is warm and dry. There is no swelling, warmth, or erythema. Range of motion is within normal limits. There is no paraspinal or spinous process tenderness. Ipsilateral and contralateral straight leg raising tests are negative. The distal neurovascular exam is grossly intact and symmetric. ASSESSMENT/PLAN:   The patient is doing well after knee arthroscopy. I have recommended ice, judicious use of NSAIDs, and physical therapy to diminish swelling and restore both motion and strength. I cautioned against overusing the knee, and they will schedule a reevaluation in a few weeks  They verbalized good understanding of the plan. I would like to set her up for [id number]. GRISEL Allen. [facility]  Sports Fellowship Trained  Board Certified  [employer] Team Physician      Disclaimer: \"This note was dictated with voice recognition software. Though review and correction are routine, we apologize for any errors. \"

## 2020-09-10 ENCOUNTER — TELEPHONE (OUTPATIENT)
Dept: ORTHOPEDIC SURGERY | Age: 65
End: 2020-09-10

## 2020-09-10 NOTE — TELEPHONE ENCOUNTER
09/10/2020 MONOVISC    BILATERAL KNEE. NO AUTHORIZATION REQUIRED. VALID & BILLABLE. CAN BUY& BILL. PER YOHAN @ [facility] REF # [id number].   AP

## 2020-10-08 ENCOUNTER — OFFICE VISIT (OUTPATIENT)
Dept: ORTHOPEDIC SURGERY | Age: 65
End: 2020-10-08
Payer: COMMERCIAL

## 2020-10-08 VITALS — TEMPERATURE: 98.1 F | BODY MASS INDEX: 26.52 KG/M2 | HEIGHT: 66 IN | WEIGHT: 165 LBS

## 2020-10-08 PROCEDURE — 20610 DRAIN/INJ JOINT/BURSA W/O US: CPT | Performed by: ORTHOPAEDIC SURGERY

## 2020-10-08 PROCEDURE — 99024 POSTOP FOLLOW-UP VISIT: CPT | Performed by: ORTHOPAEDIC SURGERY

## 2020-10-08 RX ORDER — BETAMETHASONE SODIUM PHOSPHATE AND BETAMETHASONE ACETATE 3; 3 MG/ML; MG/ML
6 INJECTION, SUSPENSION INTRA-ARTICULAR; INTRALESIONAL; INTRAMUSCULAR; SOFT TISSUE ONCE
Status: COMPLETED | OUTPATIENT
Start: 2020-10-08 | End: 2020-10-08

## 2020-10-08 RX ADMIN — BETAMETHASONE SODIUM PHOSPHATE AND BETAMETHASONE ACETATE 6 MG: 3; 3 INJECTION, SUSPENSION INTRA-ARTICULAR; INTRALESIONAL; INTRAMUSCULAR; SOFT TISSUE at 14:33

## 2021-07-02 NOTE — RESULT ENCOUNTER NOTE
Received patient in stretcher. AOX4. VSS.  Patient denies chest pain, pain, discomfort, shortness of breath, difficulty breathing and any form of distress not noted. Patient oriented to ED area. Plan of care discussed and verbalized understanding. All needs attended. Purposeful proactive hourly rounding in progress. Your test for COVID-19, also known as novel coronavirus, came back negative. No virus was detected from the sample collected. Testing is not 100%. Until your symptoms are fully resolved, you may still be contagious. We recommend that you remain isolated for 7 days minimum or 72 hours after your symptoms have completely resolved, whichever is longer. If you were exposed to a known positive COVID-19 patient, then you must remain isolated for 14 days. If you were tested for a pre-op, then you remain in isolated until your procedure. Continually monitor symptoms. Contact a medical provider if symptoms are worsening. If you have any additional questions, contact your PCP.     For additional information, please visit the Centers for Disease Control and Prevention [email or web address]

## 2024-03-25 RX ORDER — CALCIUM CARBONATE 260MG(650)
TABLET,CHEWABLE ORAL
COMMUNITY

## 2024-03-25 NOTE — PROGRESS NOTES
Patient reached __X__ yes  _____ no   VM instructions left ____ yes   phone number ________                                ____ no-office notified          Date _4/2/24________  Time _1200______  Arrival _1030  hosp-endo_____    Nothing to eat or drink after midnight-follow your doctors prep instructions-this may include taking a second dose of your prep after midnight  Responsible adult 18 or older to stay on site while you are here-drive you home-stay with you after  Follow any instructions your doctors office has given you  Bring a complete list of all your medications and supplements including name,dose,how often taken the day of your procedure  If you normally take the following medications in the morning please do so the AM of your procedure with a small sip of water       Heart,blood pressure,seizure,thyroid or breathing medications-use your inhalers-bring any rescue inhalers with you DOS       DO NOT take blood pressure medications ending in \"laverne\" or \"pril\" the AM of procedure or evening prior-DO NOT TAKE LISINOPRIL  Dr Valiente patients are not to take any medications the AM of surgery  Take half or your normal dose of any long acting insulins the night before your procedure-do not take any diabetic medications the AM of procedure  Follow your doctors instructions regarding stopping or taking  any blood thinners-if you do not have instructions-call them  Any questions call your doctor  Other ____________NONE__________________________________________________      VISITOR POLICY(subject to change)             The current policy is 2 visitors per patient.There are no children allowed.Mask at discretion of facility. Visiting hours are 8a-8p.Overnight visitors will be at the discretion of the nurse. All policies are subject to change.

## 2024-04-02 ENCOUNTER — ANESTHESIA (OUTPATIENT)
Dept: ENDOSCOPY | Age: 69
End: 2024-04-02
Payer: MEDICARE

## 2024-04-02 ENCOUNTER — ANESTHESIA EVENT (OUTPATIENT)
Dept: ENDOSCOPY | Age: 69
End: 2024-04-02
Payer: MEDICARE

## 2024-04-02 ENCOUNTER — HOSPITAL ENCOUNTER (OUTPATIENT)
Age: 69
Setting detail: OUTPATIENT SURGERY
Discharge: HOME | End: 2024-04-02
Attending: SURGERY | Admitting: SURGERY
Payer: MEDICARE

## 2024-04-02 VITALS
BODY MASS INDEX: 27.64 KG/M2 | DIASTOLIC BLOOD PRESSURE: 88 MMHG | SYSTOLIC BLOOD PRESSURE: 147 MMHG | OXYGEN SATURATION: 99 % | WEIGHT: 172 LBS | RESPIRATION RATE: 19 BRPM | HEIGHT: 66 IN | TEMPERATURE: 96.8 F | HEART RATE: 63 BPM

## 2024-04-02 PROCEDURE — 3700000000 HC ANESTHESIA ATTENDED CARE: Performed by: SURGERY

## 2024-04-02 PROCEDURE — 6360000002 HC RX W HCPCS: Performed by: NURSE ANESTHETIST, CERTIFIED REGISTERED

## 2024-04-02 PROCEDURE — 3609010600 HC COLONOSCOPY POLYPECTOMY SNARE/COLD BIOPSY: Performed by: SURGERY

## 2024-04-02 PROCEDURE — 7100000010 HC PHASE II RECOVERY - FIRST 15 MIN: Performed by: SURGERY

## 2024-04-02 PROCEDURE — 7100000001 HC PACU RECOVERY - ADDTL 15 MIN: Performed by: SURGERY

## 2024-04-02 PROCEDURE — 7100000011 HC PHASE II RECOVERY - ADDTL 15 MIN: Performed by: SURGERY

## 2024-04-02 PROCEDURE — 7100000000 HC PACU RECOVERY - FIRST 15 MIN: Performed by: SURGERY

## 2024-04-02 PROCEDURE — 88305 TISSUE EXAM BY PATHOLOGIST: CPT

## 2024-04-02 PROCEDURE — 3700000001 HC ADD 15 MINUTES (ANESTHESIA): Performed by: SURGERY

## 2024-04-02 PROCEDURE — 2500000003 HC RX 250 WO HCPCS: Performed by: NURSE ANESTHETIST, CERTIFIED REGISTERED

## 2024-04-02 PROCEDURE — 3609010300 HC COLONOSCOPY W/BIOPSY SINGLE/MULTIPLE: Performed by: SURGERY

## 2024-04-02 PROCEDURE — 2580000003 HC RX 258: Performed by: SURGERY

## 2024-04-02 PROCEDURE — 2709999900 HC NON-CHARGEABLE SUPPLY: Performed by: SURGERY

## 2024-04-02 RX ORDER — CALCIUM CARBONATE 500 MG/1
1 TABLET, CHEWABLE ORAL 2 TIMES DAILY
COMMUNITY

## 2024-04-02 RX ORDER — SODIUM CHLORIDE 9 MG/ML
INJECTION, SOLUTION INTRAVENOUS CONTINUOUS
Status: DISCONTINUED | OUTPATIENT
Start: 2024-04-02 | End: 2024-04-02 | Stop reason: HOSPADM

## 2024-04-02 RX ORDER — PROPOFOL 10 MG/ML
INJECTION, EMULSION INTRAVENOUS PRN
Status: DISCONTINUED | OUTPATIENT
Start: 2024-04-02 | End: 2024-04-02 | Stop reason: SDUPTHER

## 2024-04-02 RX ORDER — LIDOCAINE HYDROCHLORIDE 20 MG/ML
INJECTION, SOLUTION INFILTRATION; PERINEURAL PRN
Status: DISCONTINUED | OUTPATIENT
Start: 2024-04-02 | End: 2024-04-02 | Stop reason: SDUPTHER

## 2024-04-02 RX ADMIN — LIDOCAINE HYDROCHLORIDE 80 MG: 20 INJECTION, SOLUTION INFILTRATION; PERINEURAL at 12:31

## 2024-04-02 RX ADMIN — PROPOFOL 120 MCG/KG/MIN: 10 INJECTION, EMULSION INTRAVENOUS at 12:32

## 2024-04-02 RX ADMIN — PROPOFOL 80 MG: 10 INJECTION, EMULSION INTRAVENOUS at 12:31

## 2024-04-02 RX ADMIN — PROPOFOL 20 MG: 10 INJECTION, EMULSION INTRAVENOUS at 12:38

## 2024-04-02 RX ADMIN — SODIUM CHLORIDE: 9 INJECTION, SOLUTION INTRAVENOUS at 11:00

## 2024-04-02 ASSESSMENT — ENCOUNTER SYMPTOMS: SHORTNESS OF BREATH: 0

## 2024-04-02 ASSESSMENT — PAIN - FUNCTIONAL ASSESSMENT: PAIN_FUNCTIONAL_ASSESSMENT: 0-10

## 2024-04-02 NOTE — H&P
Subjective:     Doris Alebrto is a 68 y.o. female who was referred for screening colonoscopy    Patient's medications, allergies, past medical, surgical, social and family histories were reviewed and updated as appropriate.  Past medical history:  has a past medical history of Hypertension and Sleep apnea.  Past surgical history:  has a past surgical history that includes Knee arthroscopy; joint replacement; Wrist surgery (Right, 09/01/2014); Wrist surgery (Right, 02/01/2015); Wrist surgery (Left, 01/01/2012); Shoulder arthroscopy (Right, 08/14/2015); Knee arthroscopy (Left, 07/21/2020); and ORIF femur fracture (Right).  Past social history:  reports that she has never smoked. She has never used smokeless tobacco. She reports that she does not drink alcohol and does not use drugs.  Past family history: family history is not on file.    Allergies:   Allergies   Allergen Reactions    Amoxicillin-Pot Clavulanate Diarrhea     Can take keflex    Celecoxib Rash    Hydrochlorothiazide Rash    Sulfa Antibiotics Rash     celebrex caused a rash     Current medications:   Current Facility-Administered Medications:     0.9 % sodium chloride infusion, , IntraVENous, Continuous, Michael Adan MD, Last Rate: 100 mL/hr at 04/02/24 1204, NoRateChange at 04/02/24 1204    Review of Systems  A comprehensive review of systems was negative.      Objective:     /78   Pulse 67   Temp 97 °F (36.1 °C) (Temporal)   Resp 16   Ht 1.676 m (5' 6\")   Wt 78 kg (172 lb)   SpO2 99%   BMI 27.76 kg/m²   General appearance: alert, appears stated age and cooperative  Eyes: conjunctivae/corneas clear. PERRL, EOM's intact. Fundi benign.  Ears: normal TM's and external ear canals both ears  Heart: regular rate and rhythm, S1, S2 normal, no murmur, click, rub or gallop  Abdomen: soft, non-tender; bowel sounds normal; no masses,  no organomegaly  Lungs: clear to auscultation bilaterally  Rectal: normal tone    Plan:     1. Colonoscopy.

## 2024-04-02 NOTE — ANESTHESIA PRE PROCEDURE
Department of Anesthesiology  Preprocedure Note       Name:  Doris Alberto   Age:  68 y.o.  :  1955                                          MRN:  [id number]         Date:  2024      Surgeon: Surgeon(s):  Michael Adan MD    Procedure: Procedure(s):  COLONOSCOPY DIAGNOSTIC    Medications prior to admission:   Prior to Admission medications    Medication Sig Start Date End Date Taking? Authorizing Provider   calcium carbonate (TUMS) 500 MG chewable tablet Take 1 tablet by mouth 2 times daily   Yes Aretha Soriano MD   Vitamin D-Vitamin K (D3 + K2 DOTS PO) Take by mouth daily (with breakfast)   Yes Aretha Soriano MD   Zinc 10 MG LOZG Take by mouth daily (with breakfast)   Yes Aretha Soriano MD   TURMERIC-FISH OIL PO Take by mouth daily (with breakfast)   Yes Aretha Soriano MD   magnesium 30 MG tablet Take 1 tablet by mouth nightly    Aretha Soriano MD   Multiple Vitamins-Minerals (THERAPEUTIC MULTIVITAMIN-MINERALS) tablet Take 1 tablet by mouth daily    Aretha Soriano MD   Ascorbic Acid (VITAMIN C) 250 MG tablet Take 1 tablet by mouth daily    Aretha Soriano MD   venlafaxine (EFFEXOR-XR) 150 MG XR capsule Take 75 mg by mouth nightly     Aretha Soriano MD   lisinopril (PRINIVIL;ZESTRIL) 10 MG tablet Take 4 tablets by mouth daily    Aretha Soriano MD       Current medications:    Current Facility-Administered Medications   Medication Dose Route Frequency Provider Last Rate Last Admin   • 0.9 % sodium chloride infusion   IntraVENous Continuous Michael Adan  mL/hr at 24 1100 New Bag at 24 1100       Allergies:    Allergies   Allergen Reactions   • Amoxicillin-Pot Clavulanate Diarrhea     Can take keflex   • Celecoxib Rash   • Hydrochlorothiazide Rash   • Sulfa Antibiotics Rash     celebrex caused a rash       Problem List:    Patient Active Problem List   Diagnosis Code   • Sprain of wrist S63.509A   • Fracture of

## 2024-04-02 NOTE — PROGRESS NOTES
Pt arrived from endo to PACU bay 4. Reported received from endo staff. Pt minimally arousable to voice. Pt arrives on room air, vitals as charted.

## 2024-04-02 NOTE — PROGRESS NOTES
Reviewed pt problem list, history, H&P and assessment preoperatively.  Scope verified using 2 person system.  Family in waiting room.    Electronically signed by Kim Galvan RN on 4/2/2024 at 12:32 PM

## 2024-04-02 NOTE — PROGRESS NOTES
Discharge instructions review with patient and pt daughter bedside. Pt discharged via wheelchair. Pt discharged with instructions and all belongings. Pt daughter taking stable pt home.

## 2024-04-02 NOTE — OP NOTE
Patient: Doris Alberto  YOB: 1955  MRN: [id number]  [facility]:  [id number]  Provider:  Michael Adan MD    Date of Procedure: 4/2/2024    Pre-Op Diagnosis: Screening for colon cancer    Post-Op Diagnosis: Polyps of the transverse colon and rectum      Procedure colonoscopy up to cecum with snare polypectomy of the transverse colon  Snare polypectomy of the rectum and 2 biopsy polypectomies of the rectum    Surgeon(s):  Michael Adan MD    Anesthesia: Monitor Anesthesia Care    Estimated Blood Loss (mL): Minimal    Detailed Description of Procedure:   The patient was placed in the left lateral position.  Olympus colonoscope was inserted all the way up to the cecum.  Cecum ileocecal valve were within normal limits.  Her prep was moderate.  She had 1.3 cm polyp in the distal transverse colon removed with electrocautery snare.  There were 2 polyps in the rectum measuring about 1.2 cm in size were also removed electrocautery snare.  There were 2 small polyps in the lower rectum removed with biopsy forceps.  She had diverticulosis of the sigmoid colon.  She also had large internal hemorrhoids.  No cancer was seen.  Colon mucosa was normal.    Patient tolerated the procedure well and left the endoscopy room in a satisfactory condition.    Electronically signed by Michael Adan MD on 4/2/2024 at 1:04 PM

## 2024-04-02 NOTE — ANESTHESIA POSTPROCEDURE EVALUATION
Department of Anesthesiology  Postprocedure Note    Patient: Doris Alberto  MRN: [id number]  YOB: 1955  Date of evaluation: 4/2/2024    Procedure Summary       Date: 04/02/24 Room / Location: [place] [address] / [facility]    Anesthesia Start: 1229 Anesthesia Stop: 1311    Procedure: COLONOSCOPY POLYPECTOMY HOT BIOPSY Diagnosis:       Colon cancer screening      (Colon cancer screening [Z12.11])    Surgeons: Michael Adan MD Responsible Provider: Daljit Patel MD    Anesthesia Type: MAC ASA Status: 2            Anesthesia Type: No value filed.    Cristo Phase I: Cristo Score: 10    Cristo Phase II:      Anesthesia Post Evaluation    Patient location during evaluation: PACU  Patient participation: complete - patient participated  Level of consciousness: awake  Airway patency: patent  Nausea & Vomiting: no nausea and no vomiting  Cardiovascular status: hemodynamically stable  Respiratory status: acceptable  Hydration status: stable  Multimodal analgesia pain management approach  Pain management: adequate    No notable events documented.

## 2024-04-02 NOTE — DISCHARGE INSTRUCTIONS
Call Dr. Adan for any problems and to schedule followup [phone].      ENDOSCOPY DISCHARGE INSTRUCTIONS    You may experience some lightheadedness for the next several hours.  Plan on quiet relaxation for the rest of today.  A responsible adult needs to stay with you today.  Because of the medications you received today-do not drive,operate machinery,or sign any contractual agreement for the next 24 hours.  Do not drink any alcoholic beverages or take any unprescribed medications tonight.  Eat bland food and avoid anything greasy or spicy initially-progress to your normal diet gradually.  Diet restrictions as instructed.  You may resume home medications as instructed.  It is not unusual to experience some mild cramping or gas pains, and you may not have a bowel movement for several days.  If you have any of the following problems, notify your physician or return to the hospital emergency room : fever, chills, excessive bleeding, excessive vomiting, difficulty swallowing, uncontrolled pain, increased abdominal distention, shortness of breath or any other problems.  If you had a polyp removed, avoid strenuous activity for 48 hours.Avoid the use of aspirin or related compounds for one week, unless otherwise instructed by your physician.  You may notice a small amount of blood in your next few bowel movements, but if a large amount passes, call your physician.  If you have a sore throat, you may use lozenges or salt water gargles.  If you had biopsy's taken from your procedure call the office for results in 5-7 days.     ANESTHESIA DISCHARGE INSTRUCTIONS    Wear your seatbelt home.  You are under the influence of drugs-do not drink alcohol,drive,operate machinery,or make any important decisions or sign any legal documentsfor 24 hours  A responsible adult needs to be with you for 24 hours.  You may experience lightheadedness,dizziness,or sleepiness following surgery.  Rest at home today- increase activity as

## 2025-05-30 ENCOUNTER — TELEPHONE (OUTPATIENT)
Dept: PULMONOLOGY | Age: 70
End: 2025-05-30

## 2025-05-30 ENCOUNTER — OFFICE VISIT (OUTPATIENT)
Dept: PULMONOLOGY | Age: 70
End: 2025-05-30
Payer: MEDICARE

## 2025-05-30 VITALS
OXYGEN SATURATION: 97 % | BODY MASS INDEX: 29.02 KG/M2 | DIASTOLIC BLOOD PRESSURE: 66 MMHG | HEART RATE: 72 BPM | HEIGHT: 66 IN | SYSTOLIC BLOOD PRESSURE: 118 MMHG | WEIGHT: 180.6 LBS

## 2025-05-30 DIAGNOSIS — R91.1 PULMONARY NODULE: Primary | ICD-10-CM

## 2025-05-30 PROCEDURE — G8400 PT W/DXA NO RESULTS DOC: HCPCS | Performed by: INTERNAL MEDICINE

## 2025-05-30 PROCEDURE — 3074F SYST BP LT 130 MM HG: CPT | Performed by: INTERNAL MEDICINE

## 2025-05-30 PROCEDURE — 1036F TOBACCO NON-USER: CPT | Performed by: INTERNAL MEDICINE

## 2025-05-30 PROCEDURE — 1123F ACP DISCUSS/DSCN MKR DOCD: CPT | Performed by: INTERNAL MEDICINE

## 2025-05-30 PROCEDURE — 1159F MED LIST DOCD IN RCRD: CPT | Performed by: INTERNAL MEDICINE

## 2025-05-30 PROCEDURE — G8419 CALC BMI OUT NRM PARAM NOF/U: HCPCS | Performed by: INTERNAL MEDICINE

## 2025-05-30 PROCEDURE — 1160F RVW MEDS BY RX/DR IN RCRD: CPT | Performed by: INTERNAL MEDICINE

## 2025-05-30 PROCEDURE — 99204 OFFICE O/P NEW MOD 45 MIN: CPT | Performed by: INTERNAL MEDICINE

## 2025-05-30 PROCEDURE — 3078F DIAST BP <80 MM HG: CPT | Performed by: INTERNAL MEDICINE

## 2025-05-30 PROCEDURE — 3017F COLORECTAL CA SCREEN DOC REV: CPT | Performed by: INTERNAL MEDICINE

## 2025-05-30 PROCEDURE — G8427 DOCREV CUR MEDS BY ELIG CLIN: HCPCS | Performed by: INTERNAL MEDICINE

## 2025-05-30 PROCEDURE — 1090F PRES/ABSN URINE INCON ASSESS: CPT | Performed by: INTERNAL MEDICINE

## 2025-05-30 ASSESSMENT — ENCOUNTER SYMPTOMS
NAUSEA: 0
DIARRHEA: 0
CONSTIPATION: 0
SINUS PRESSURE: 0
ABDOMINAL PAIN: 0

## 2025-05-30 NOTE — PROGRESS NOTES
[facility]  Consult Note  MD Nya Carlosnna SARAH Alberto   YOB: 1955    Date of Visit:  5/30/2025    Assessment/Plan:  1. Pulmonary nodule  I reviewed imaging from an outside facility.  My impression is that there was a complex of nodules in the right upper lobe.  Some of this actually looks like it could be cavitary.  While malignancy is not excluded, I am suspicious of an infectious etiology.    I discussed options with the patient including waiting and repeating CT scan in 3 months.  We also discussed the possibility of doing a biopsy.  However, ultimately we decided on getting a PET scan in middle June which would be about a month  from the index scan.    Results of the PET scan will drive further investigation, either biopsy or serial CT scanning.    Follow-up here after PET scan is complete  - PET CT SKULL BASE TO MID THIGH; Future      Chief Complaint   Patient presents with    New Patient     Pulmonary nodule wa seen at [place], patient wants a new pulmonary doctor since [facility]  Patient presents for evaluation of abnormal CT scan of the chest.  She had had a cardiac CT scan due to a strong family history of heart disease.  CT scan revealed a 16mm right upper lobe pulmonary nodule.  The patient is a lifetime non-smoker.  She has had no significant secondary smoke exposure.  She does not vape.  There is no prior history of pulmonary issues including pneumonia or asthma.  She is not having cough, sputum or hemoptysis.  There is no complaint of anorexia or weight loss.    Review of Systems  Review of Systems   Constitutional:  Negative for fatigue and fever.   HENT:  Negative for congestion and sinus pressure.    Eyes:  Negative for visual disturbance.   Cardiovascular:  Negative for chest pain and palpitations.   Gastrointestinal:  Negative for abdominal pain, constipation, diarrhea and nausea.   Genitourinary:

## 2025-05-30 NOTE — TELEPHONE ENCOUNTER
[facility] Pet Scan lvm to speak with Shirley regarding pt, no further details given.

## 2025-06-03 ENCOUNTER — TELEPHONE (OUTPATIENT)
Dept: PULMONOLOGY | Age: 70
End: 2025-06-03

## 2025-06-17 ENCOUNTER — TELEPHONE (OUTPATIENT)
Dept: PULMONOLOGY | Age: 70
End: 2025-06-17

## 2025-06-20 ENCOUNTER — OFFICE VISIT (OUTPATIENT)
Dept: PULMONOLOGY | Age: 70
End: 2025-06-20
Payer: MEDICARE

## 2025-06-20 VITALS
SYSTOLIC BLOOD PRESSURE: 120 MMHG | OXYGEN SATURATION: 98 % | HEIGHT: 66 IN | DIASTOLIC BLOOD PRESSURE: 68 MMHG | WEIGHT: 180.6 LBS | HEART RATE: 67 BPM | BODY MASS INDEX: 29.02 KG/M2

## 2025-06-20 DIAGNOSIS — R91.1 PULMONARY NODULE: Primary | ICD-10-CM

## 2025-06-20 PROCEDURE — 1159F MED LIST DOCD IN RCRD: CPT | Performed by: INTERNAL MEDICINE

## 2025-06-20 PROCEDURE — 1123F ACP DISCUSS/DSCN MKR DOCD: CPT | Performed by: INTERNAL MEDICINE

## 2025-06-20 PROCEDURE — 1036F TOBACCO NON-USER: CPT | Performed by: INTERNAL MEDICINE

## 2025-06-20 PROCEDURE — 3074F SYST BP LT 130 MM HG: CPT | Performed by: INTERNAL MEDICINE

## 2025-06-20 PROCEDURE — G8427 DOCREV CUR MEDS BY ELIG CLIN: HCPCS | Performed by: INTERNAL MEDICINE

## 2025-06-20 PROCEDURE — G8400 PT W/DXA NO RESULTS DOC: HCPCS | Performed by: INTERNAL MEDICINE

## 2025-06-20 PROCEDURE — 3078F DIAST BP <80 MM HG: CPT | Performed by: INTERNAL MEDICINE

## 2025-06-20 PROCEDURE — 3017F COLORECTAL CA SCREEN DOC REV: CPT | Performed by: INTERNAL MEDICINE

## 2025-06-20 PROCEDURE — 99213 OFFICE O/P EST LOW 20 MIN: CPT | Performed by: INTERNAL MEDICINE

## 2025-06-20 PROCEDURE — G8419 CALC BMI OUT NRM PARAM NOF/U: HCPCS | Performed by: INTERNAL MEDICINE

## 2025-06-20 PROCEDURE — 1090F PRES/ABSN URINE INCON ASSESS: CPT | Performed by: INTERNAL MEDICINE

## 2025-06-20 PROCEDURE — 1160F RVW MEDS BY RX/DR IN RCRD: CPT | Performed by: INTERNAL MEDICINE

## 2025-06-20 NOTE — PROGRESS NOTES
[facility]  Consult Note  MD Nya Carlosedgar Alberto   YOB: 1955    Date of Visit:  6/20/2025    Assessment/Plan:  1. Pulmonary nodule          I reviewed CT imaging and PET scan imaging.  Representative images are displayed above  PET scan does reveal some activity within the lesion.  SUV was 2.6.  Notably, the lesion has not changed significantly in size in the 1 month interval.    This still could represent either infection/inflammatory process or malignant process.  Discussed options with the patient including IR biopsy, navigational biopsy and observation.  If observation is chosen, would recommend an antibiotic course and repeat CT scan in about 8 weeks.    The patient wants to consider her choices and call us back with her decision.      Chief Complaint   Patient presents with    Follow-up     Discuss the bronch with ION  Discuss the PET scan       HPI  The presents today along with her daughter to review recent PET scan imaging.  I had seen her previously with the an incidentally discovered pulmonary nodule.  This was noted during a CT angiogram for her heart.  The patient is a lifetime non-smoker with minimal secondary smoke exposure.  She really has not felt like she has had infection.  There has been no cough sputum or fever.  There is no prior pulmonary history.    Review of Systems  No Chest pain, Nausea or vomiting reported      History  I have reviewed past medical, surgical, social and family history. This is documented elsewhere in themedical record.     Physical Exam:  Well developed, well nourished  Alert and oriented  Sclera is clear  No cervical adenopathy  No JVD.  Chest examination is clear.  Cardiac examination reveals regular rate and rhythm without murmur, gallop or rub.  The abdomen is soft, nontender and nondistended.   There is no clubbing, cyanosis or edema of the extremities.  There is no obvious skin rash.  No

## 2025-06-23 ENCOUNTER — TELEPHONE (OUTPATIENT)
Dept: PULMONOLOGY | Age: 70
End: 2025-06-23

## 2025-06-23 NOTE — TELEPHONE ENCOUNTER
Patient called letting us know she is would like to go through with the bronchoscopy.  Patient prefer Harry 27 or 30.   What type of  bronch?

## 2025-06-24 ENCOUNTER — TELEPHONE (OUTPATIENT)
Dept: PULMONOLOGY | Age: 70
End: 2025-06-24

## 2025-06-24 DIAGNOSIS — R91.1 PULMONARY NODULE: Primary | ICD-10-CM

## 2025-06-24 NOTE — TELEPHONE ENCOUNTER
Patient called back and understands bronch will be on Monday June 30 at 11:00  arrival at 10:30.  Needs CT chest done on Thurs June 26.

## 2025-06-25 NOTE — PROGRESS NOTES
Patient Reached:  Yes_X__   No___    Voicemail Instructions Given: Yes___  No___  # Called:       Date: 6/ 30/ 2025      Arrival Time: 9:30 AM      Procedure Time:  11:00 AM      Location: [facility] . [place], [place]      ([facility])      -Nothing to eat or drink after midnight. Including no Gum, candy and mints.    -You may brush your teeth, but do not swallow any water.    -You will need a responsible adult, 18 or older, to stay on site while you are here-drive you home-stay with you after.    -Bring a complete list of all your medications and supplements including name, dose.    -Follow any additional instructions your doctors office has given you. Especially regarding medications.    -If you normally take the following medications in the morning please do so the AM of your procedure with a small sip of water.         *Heart,blood pressure,seizure,thyroid or breathing medications-use your inhalers-bring any rescue inhalers with you DOS         *DO NOT take blood pressure medications ending in  \"laverne\" or \"pril\"  the AM of procedure or evening prior    -Please hold any GLP-1 Medications (Ozempic, Mounjaro, Trulicity, etc.) for 1 Week prior to procedure.    -Please hold any SGLT2 Inhibitors (Invokana, Farxiga, Jardiance, etc.) for 3 Days prior to procedure.    -Take half or your normal dose of any long acting insulins the night before your procedure-do not take any diabetic medications the AM of procedure    -Follow your doctors instructions regarding stopping or taking  any blood thinners-if you do not have instructions-call them    -Dress comfortably. Please bring insurance card and photo ID.    -Any questions call the Pulmonology Office: [phone]    -Additional Instructions:                VISITOR POLICY(subject to change):    The current policy is 2 visitors per patient. There are no children under the age of 14 permitted. Masking not required but advised. Visiting hours are  Ms Azul is a 74 yo F w history of SCZ, Parkinson's dz, hypothyroidism, and multiple psych hospitalizations who presents for AMS. Pt transferred from [facility], where she was found to have fallen and was covered in urine. Pt has been hospitalized in [facility] with occasional stays at [facility] for medical concerns since the start of this year for psychosis. Per [facility] notes, pt appears to be awaiting transfer to [facility] hospital. Pt has been delusional at [facility] with recent delusions surrounding pregnancy. Current medications are Depakote 750 mg bid, Seroquel 50 mg bid, and Prolixin Dec 12.5 mg IM q2w (last dose 9/24).     In [facility] stroke code was negative for acute pathology. Pt refused MRI and neuro deemed it not necessary. EEG did not reveal seizure activity and toxic encephalopathy workup was also negative. Psychiatry continued Depakote 1000mg BID, Seroquel 75mg QHS and Prolixin Dec 12.5 mg IM q2w (last dose given on 10/9), next dose due 10/23/24. As per psych CL pt has been irritable, aggressive at times requiring PRN medications and restraints. Paranoid and disorganized.     On arrival to  pt continues to present paranoid delusions, mentions she was poisoned by nurses at [facility], continues to endorse delusions of pregnancy. Denies SI/HI. Requires inpatient level of care due to inability to care for herself or transition to outpatient level of care given severe episodes of agitation, aggression and disorganized behavior in the context of psychosis.        10/22 Clinical Update: Ms. Azul refused to have her vital signs taken this morning. No behavioral outbursts or aggression reported. Bloodwork reviewed and WNL. Valproic acid level within therapeutic range at 65.70. Pt is visible in the dayroom today. Seems sedated, dozing off during interview. May need Seroquel dose to be adjusted to minimize daytime sedation. Due for Prolixin dec tomorrow.    10/23: Refused VS, irritable on approach, will offer Prolixin Dec tomorrow if more agreeable.  Did take PO meds.    10/24: Episodic agitation, did require IM prn last night.  Refused SHAFER and declined AM meds.  10/25: Remains irritable, refusing meds and VS, declines SHAFER, will likely require MOO.  10/28 Patient psychotic tenuous will increase Seroquel request transfer hearing start TOO, change prn to Prolixin Benadryl as never has had much response to olanzapine  10/29 Somewhat calmer today possibly from prn and taking Seroquel last night but refused BP meds, and Depakote which may affect medical condition encourage compliance placed MOO paperwork  10/30 Selectively compliant with medications. Remains illogical, paranoid, uncooperative with care  10/31 Psychotic disorganized needing prns taking medical meds part of time. Cont meds plan TOO hearing  11/1 Calmer at moment but quite psychotic, compliance with medical and psych meds very erratic Cont meds and plan for TOO hearing  11/2: Noncompliant with medications, uncooperative with unit rules and ADL care.   11/3: Remains paranoid  11/4 Agitated tenuous poorly compliant will go to court for TOO, will move Seroquel to all PM at her request with hope this may improve compliance  11/5 Somewhat calmer today no prns, very psychotic, erratic compliance. Will offer decanoate today  she is due, plan court for TOO in AM.  11/6 Patient psychotic, not much change will give Prolixin  18.75mg today, Cont to titrate Seroquel but only hs at her request.   11/7 Patient irritable, aggressive delusional. Cont decanoate along with increasing Seroquel and using prns , await state transfer  11/8 Patient irritable more negativistic today do not feel though she has catatonic mutism as she is talking to other staff and peers other than writer. Cont to titrate Seroquel if she is taking regularly. If she refuses Depakote regularly may need to eval for transfer for IV anti convulsants.  11/9 guarded, easily irritable but more visible, no acute events overnight/this am, partially compliant with medical meds. No SI/HI reported.   11/12 guarded, uncooperative, disorganized and irritable, partially cooperative with meds, refuses vitals.   11/13 needed PRN IM and restraints for agitation, physical aggression, calmer later on. Remains delusional, disorganized and labile, partially compliant with medications.  11/14 no overnight events, no PRN needed, refused am meds, complied with hs seroquel. Remains guarded, uncooperative and dismissive, no suicidal/homicidal behavior.   11/15 no acute outbursts, no PRN needed. She does not think seroquel is helpful, only partially compliant with medications, no SI/HI. She spoke to  about abilify, to review her past medications trials.    Plan:  -[facility] 2S under 2PC legal status  -Routine checks  -Bed block  -Continue Depakote 1000mg BID  -Continue Seroquel 150 mg QHS  -Prolixin Dec 18.75 mg received on 11/6/24.  -Neuro workup at [facility] negative for seizure activity or acute stroke

## 2025-06-26 ENCOUNTER — HOSPITAL ENCOUNTER (OUTPATIENT)
Dept: CT IMAGING | Age: 70
Discharge: HOME | End: 2025-06-26
Attending: INTERNAL MEDICINE
Payer: MEDICARE

## 2025-06-26 DIAGNOSIS — R91.1 PULMONARY NODULE: ICD-10-CM

## 2025-06-26 PROCEDURE — 71250 CT THORAX DX C-: CPT

## 2025-06-30 ENCOUNTER — ANESTHESIA (OUTPATIENT)
Dept: ENDOSCOPY | Age: 70
End: 2025-06-30
Payer: MEDICARE

## 2025-06-30 ENCOUNTER — APPOINTMENT (OUTPATIENT)
Dept: GENERAL RADIOLOGY | Age: 70
End: 2025-06-30
Attending: INTERNAL MEDICINE
Payer: MEDICARE

## 2025-06-30 ENCOUNTER — HOSPITAL ENCOUNTER (OUTPATIENT)
Age: 70
Setting detail: OUTPATIENT SURGERY
Discharge: HOME | End: 2025-06-30
Attending: INTERNAL MEDICINE | Admitting: INTERNAL MEDICINE
Payer: MEDICARE

## 2025-06-30 ENCOUNTER — ANESTHESIA EVENT (OUTPATIENT)
Dept: ENDOSCOPY | Age: 70
End: 2025-06-30
Payer: MEDICARE

## 2025-06-30 VITALS
DIASTOLIC BLOOD PRESSURE: 78 MMHG | WEIGHT: 180 LBS | TEMPERATURE: 97.5 F | RESPIRATION RATE: 24 BRPM | OXYGEN SATURATION: 95 % | HEART RATE: 91 BPM | HEIGHT: 66 IN | BODY MASS INDEX: 28.93 KG/M2 | SYSTOLIC BLOOD PRESSURE: 152 MMHG

## 2025-06-30 LAB
APTT BLD: 32.9 SEC (ref 22.8–35.8)
BASOPHILS # BLD: 0 K/UL (ref 0–0.2)
BASOPHILS NFR BLD: 0.7 %
DEPRECATED RDW RBC AUTO: 12.8 % (ref 12.4–15.4)
EOSINOPHIL # BLD: 0.2 K/UL (ref 0–0.6)
EOSINOPHIL NFR BLD: 3.7 %
HCT VFR BLD AUTO: 38.8 % (ref 36–48)
HGB BLD-MCNC: 13.3 G/DL (ref 12–16)
INR PPP: 1 (ref 0.86–1.14)
LYMPHOCYTES # BLD: 1.3 K/UL (ref 1–5.1)
LYMPHOCYTES NFR BLD: 24.3 %
MCH RBC QN AUTO: 31.7 PG (ref 26–34)
MCHC RBC AUTO-ENTMCNC: 34.3 G/DL (ref 31–36)
MCV RBC AUTO: 92.3 FL (ref 80–100)
MONOCYTES # BLD: 0.4 K/UL (ref 0–1.3)
MONOCYTES NFR BLD: 7.2 %
NEUTROPHILS # BLD: 3.4 K/UL (ref 1.7–7.7)
NEUTROPHILS NFR BLD: 64.1 %
PLATELET # BLD AUTO: 239 K/UL (ref 135–450)
PMV BLD AUTO: 7.1 FL (ref 5–10.5)
PROTHROMBIN TIME: 13.5 SEC (ref 12.1–14.9)
RBC # BLD AUTO: 4.2 M/UL (ref 4–5.2)
WBC # BLD AUTO: 5.2 K/UL (ref 4–11)

## 2025-06-30 PROCEDURE — 7100000001 HC PACU RECOVERY - ADDTL 15 MIN: Performed by: INTERNAL MEDICINE

## 2025-06-30 PROCEDURE — C1725 CATH, TRANSLUMIN NON-LASER: HCPCS | Performed by: INTERNAL MEDICINE

## 2025-06-30 PROCEDURE — 2500000003 HC RX 250 WO HCPCS: Performed by: NURSE ANESTHETIST, CERTIFIED REGISTERED

## 2025-06-30 PROCEDURE — 71045 X-RAY EXAM CHEST 1 VIEW: CPT

## 2025-06-30 PROCEDURE — 6360000002 HC RX W HCPCS: Performed by: NURSE ANESTHETIST, CERTIFIED REGISTERED

## 2025-06-30 PROCEDURE — 3700000001 HC ADD 15 MINUTES (ANESTHESIA): Performed by: INTERNAL MEDICINE

## 2025-06-30 PROCEDURE — 2709999900 HC NON-CHARGEABLE SUPPLY: Performed by: INTERNAL MEDICINE

## 2025-06-30 PROCEDURE — 7100000000 HC PACU RECOVERY - FIRST 15 MIN: Performed by: INTERNAL MEDICINE

## 2025-06-30 PROCEDURE — 2580000003 HC RX 258: Performed by: INTERNAL MEDICINE

## 2025-06-30 PROCEDURE — 7100000011 HC PHASE II RECOVERY - ADDTL 15 MIN: Performed by: INTERNAL MEDICINE

## 2025-06-30 PROCEDURE — 3609155400 HC ADD ON COMPUTER ASSISTED NAVIGATION: Performed by: INTERNAL MEDICINE

## 2025-06-30 PROCEDURE — 2720000010 HC SURG SUPPLY STERILE: Performed by: INTERNAL MEDICINE

## 2025-06-30 PROCEDURE — 3700000000 HC ANESTHESIA ATTENDED CARE: Performed by: INTERNAL MEDICINE

## 2025-06-30 PROCEDURE — 85610 PROTHROMBIN TIME: CPT

## 2025-06-30 PROCEDURE — 85730 THROMBOPLASTIN TIME PARTIAL: CPT

## 2025-06-30 PROCEDURE — 2580000003 HC RX 258: Performed by: NURSE ANESTHETIST, CERTIFIED REGISTERED

## 2025-06-30 PROCEDURE — 85025 COMPLETE CBC W/AUTO DIFF WBC: CPT

## 2025-06-30 PROCEDURE — 7100000010 HC PHASE II RECOVERY - FIRST 15 MIN: Performed by: INTERNAL MEDICINE

## 2025-06-30 RX ORDER — SODIUM CHLORIDE 0.9 % (FLUSH) 0.9 %
5-40 SYRINGE (ML) INJECTION PRN
Status: DISCONTINUED | OUTPATIENT
Start: 2025-06-30 | End: 2025-06-30 | Stop reason: HOSPADM

## 2025-06-30 RX ORDER — NALOXONE HYDROCHLORIDE 0.4 MG/ML
INJECTION, SOLUTION INTRAMUSCULAR; INTRAVENOUS; SUBCUTANEOUS PRN
Status: DISCONTINUED | OUTPATIENT
Start: 2025-06-30 | End: 2025-06-30 | Stop reason: HOSPADM

## 2025-06-30 RX ORDER — GLYCOPYRROLATE 0.2 MG/ML
INJECTION INTRAMUSCULAR; INTRAVENOUS
Status: DISCONTINUED | OUTPATIENT
Start: 2025-06-30 | End: 2025-06-30 | Stop reason: SDUPTHER

## 2025-06-30 RX ORDER — DEXAMETHASONE SODIUM PHOSPHATE 4 MG/ML
INJECTION, SOLUTION INTRA-ARTICULAR; INTRALESIONAL; INTRAMUSCULAR; INTRAVENOUS; SOFT TISSUE
Status: DISCONTINUED | OUTPATIENT
Start: 2025-06-30 | End: 2025-06-30 | Stop reason: SDUPTHER

## 2025-06-30 RX ORDER — SODIUM CHLORIDE 0.9 % (FLUSH) 0.9 %
5-40 SYRINGE (ML) INJECTION EVERY 12 HOURS SCHEDULED
Status: DISCONTINUED | OUTPATIENT
Start: 2025-06-30 | End: 2025-06-30 | Stop reason: HOSPADM

## 2025-06-30 RX ORDER — ONDANSETRON 2 MG/ML
INJECTION INTRAMUSCULAR; INTRAVENOUS
Status: DISCONTINUED | OUTPATIENT
Start: 2025-06-30 | End: 2025-06-30 | Stop reason: SDUPTHER

## 2025-06-30 RX ORDER — AMLODIPINE BESYLATE 2.5 MG/1
2.5 TABLET ORAL DAILY
COMMUNITY

## 2025-06-30 RX ORDER — SODIUM CHLORIDE 9 MG/ML
INJECTION, SOLUTION INTRAVENOUS PRN
Status: DISCONTINUED | OUTPATIENT
Start: 2025-06-30 | End: 2025-06-30 | Stop reason: HOSPADM

## 2025-06-30 RX ORDER — LIDOCAINE HYDROCHLORIDE 20 MG/ML
INJECTION, SOLUTION INFILTRATION; PERINEURAL
Status: DISCONTINUED | OUTPATIENT
Start: 2025-06-30 | End: 2025-06-30 | Stop reason: SDUPTHER

## 2025-06-30 RX ORDER — SODIUM CHLORIDE 9 MG/ML
INJECTION, SOLUTION INTRAVENOUS CONTINUOUS
Status: DISCONTINUED | OUTPATIENT
Start: 2025-06-30 | End: 2025-06-30 | Stop reason: HOSPADM

## 2025-06-30 RX ORDER — ONDANSETRON 2 MG/ML
4 INJECTION INTRAMUSCULAR; INTRAVENOUS
Status: DISCONTINUED | OUTPATIENT
Start: 2025-06-30 | End: 2025-06-30 | Stop reason: HOSPADM

## 2025-06-30 RX ORDER — MIDAZOLAM HYDROCHLORIDE 1 MG/ML
INJECTION, SOLUTION INTRAMUSCULAR; INTRAVENOUS
Status: DISCONTINUED | OUTPATIENT
Start: 2025-06-30 | End: 2025-06-30 | Stop reason: SDUPTHER

## 2025-06-30 RX ORDER — FENTANYL CITRATE 50 UG/ML
INJECTION, SOLUTION INTRAMUSCULAR; INTRAVENOUS
Status: DISCONTINUED | OUTPATIENT
Start: 2025-06-30 | End: 2025-06-30 | Stop reason: SDUPTHER

## 2025-06-30 RX ORDER — ROCURONIUM BROMIDE 10 MG/ML
INJECTION, SOLUTION INTRAVENOUS
Status: DISCONTINUED | OUTPATIENT
Start: 2025-06-30 | End: 2025-06-30 | Stop reason: SDUPTHER

## 2025-06-30 RX ORDER — PROPOFOL 10 MG/ML
INJECTION, EMULSION INTRAVENOUS
Status: DISCONTINUED | OUTPATIENT
Start: 2025-06-30 | End: 2025-06-30 | Stop reason: SDUPTHER

## 2025-06-30 RX ADMIN — PHENYLEPHRINE HYDROCHLORIDE 200 MCG: 10 INJECTION INTRAVENOUS at 11:51

## 2025-06-30 RX ADMIN — PHENYLEPHRINE HYDROCHLORIDE 100 MCG: 10 INJECTION INTRAVENOUS at 11:43

## 2025-06-30 RX ADMIN — GLYCOPYRROLATE 0.2 MG: 0.2 INJECTION, SOLUTION INTRAMUSCULAR; INTRAVENOUS at 11:16

## 2025-06-30 RX ADMIN — PHENYLEPHRINE HYDROCHLORIDE 100 MCG: 10 INJECTION INTRAVENOUS at 11:36

## 2025-06-30 RX ADMIN — PHENYLEPHRINE HYDROCHLORIDE 200 MCG: 10 INJECTION INTRAVENOUS at 11:57

## 2025-06-30 RX ADMIN — SUGAMMADEX 200 MG: 100 INJECTION, SOLUTION INTRAVENOUS at 12:37

## 2025-06-30 RX ADMIN — PHENYLEPHRINE HYDROCHLORIDE 50 MCG/MIN: 10 INJECTION INTRAVENOUS at 12:15

## 2025-06-30 RX ADMIN — PROPOFOL 80 MCG/KG/MIN: 10 INJECTION, EMULSION INTRAVENOUS at 11:22

## 2025-06-30 RX ADMIN — ROCURONIUM BROMIDE 50 MG: 10 INJECTION, SOLUTION INTRAVENOUS at 11:18

## 2025-06-30 RX ADMIN — MIDAZOLAM 2 MG: 1 INJECTION INTRAMUSCULAR; INTRAVENOUS at 11:13

## 2025-06-30 RX ADMIN — SODIUM CHLORIDE: 9 INJECTION, SOLUTION INTRAVENOUS at 10:20

## 2025-06-30 RX ADMIN — SODIUM CHLORIDE: 9 INJECTION, SOLUTION INTRAVENOUS at 12:45

## 2025-06-30 RX ADMIN — GLYCOPYRROLATE 0.2 MG: 0.2 INJECTION, SOLUTION INTRAMUSCULAR; INTRAVENOUS at 12:25

## 2025-06-30 RX ADMIN — SUGAMMADEX 100 MG: 100 INJECTION, SOLUTION INTRAVENOUS at 12:45

## 2025-06-30 RX ADMIN — DEXAMETHASONE SODIUM PHOSPHATE 8 MG: 4 INJECTION, SOLUTION INTRAMUSCULAR; INTRAVENOUS at 11:24

## 2025-06-30 RX ADMIN — ROCURONIUM BROMIDE 30 MG: 10 INJECTION, SOLUTION INTRAVENOUS at 12:11

## 2025-06-30 RX ADMIN — ONDANSETRON 4 MG: 2 INJECTION INTRAMUSCULAR; INTRAVENOUS at 11:26

## 2025-06-30 RX ADMIN — PROPOFOL 200 MG: 10 INJECTION, EMULSION INTRAVENOUS at 11:18

## 2025-06-30 RX ADMIN — LIDOCAINE HYDROCHLORIDE 100 MG: 20 INJECTION, SOLUTION INFILTRATION; PERINEURAL at 11:18

## 2025-06-30 RX ADMIN — PHENYLEPHRINE HYDROCHLORIDE 100 MCG: 10 INJECTION INTRAVENOUS at 11:40

## 2025-06-30 RX ADMIN — PROPOFOL 100 MG: 10 INJECTION, EMULSION INTRAVENOUS at 11:25

## 2025-06-30 RX ADMIN — FENTANYL CITRATE 100 MCG: 50 INJECTION, SOLUTION INTRAMUSCULAR; INTRAVENOUS at 11:18

## 2025-06-30 RX ADMIN — PHENYLEPHRINE HYDROCHLORIDE 100 MCG: 10 INJECTION INTRAVENOUS at 11:29

## 2025-06-30 RX ADMIN — PHENYLEPHRINE HYDROCHLORIDE 200 MCG: 10 INJECTION INTRAVENOUS at 12:06

## 2025-06-30 RX ADMIN — PHENYLEPHRINE HYDROCHLORIDE 200 MCG: 10 INJECTION INTRAVENOUS at 11:32

## 2025-06-30 ASSESSMENT — PAIN - FUNCTIONAL ASSESSMENT
PAIN_FUNCTIONAL_ASSESSMENT: 0-10
PAIN_FUNCTIONAL_ASSESSMENT: WONG-BAKER FACES
PAIN_FUNCTIONAL_ASSESSMENT: NONE - DENIES PAIN
PAIN_FUNCTIONAL_ASSESSMENT: NONE - DENIES PAIN

## 2025-06-30 NOTE — PROCEDURES
Bronchoscopy Procedure Note    Date of Operation: 6/30/25  Pre-op Diagnosis: Pulmonary nodule  Post-op Diagnosis: Same  Surgeon: Roni Ríos MD  Anesthesia: General endotracheal anesthesia  Estimate Blood Loss: Minimal   Complications: None    Indications and History:  The patient is 70 y.o. female with PET avid pulmonary nodule in the right middle lobe. The risks, benefits, complications, treatment options and expected outcomes were discussed with the patient. The possibilities of reaction to medication, pulmonary aspiration, perforation of a viscus, bleeding, failure to diagnose a condition and creating a complication requiring transfusion or operation were discussed with the patient who freely signed the consent.     Description of Procedure:   The patient was taken to endoscopy suite, identified as Doris SARAH Alberto and the procedure verified. A time out was held and the above information confirmed.   After the initiation of anesthesia, the patient was placed in appropriate position and the bronchoscope was passed through the ETT . The scope was then passed into the trachea. Lidocaine 2% 3 ml was used topically on the mansi. Careful inspection of the tracheal lumen was accomplished. The scope was sequentially passed into the left main and then left upper and lower bronchi and segmental bronchi.   The scope was then withdrawn and advanced into the right main bronchus and then into the RUL, RML, and RLL bronchi and segmental bronchi.     Endobronchial findings:   Vocal Cords: Normal  Trachea: Normal mucosa   Mansi: Normal mucosa   Right main bronchus: Normal mucosa   Right upper lobe bronchus: Normal mucosa   Right middle lobe bronchus: Normal mucosa   Right lower lobe bronchus: Normal mucosa   Left main bronchus: Normal mucosa   Left upper lobe bronchus: Normal mucosa   Left lower lobe bronchus: Normal mucosa     Using ION computer assisted navigation, the bronchoscope was advanced into position in the

## 2025-06-30 NOTE — ANESTHESIA POSTPROCEDURE EVALUATION
Department of Anesthesiology  Postprocedure Note    Patient: Doris Alberto  MRN: [id number]  YOB: 1955  Date of evaluation: 6/30/2025    Procedure Summary       Date: 06/30/25 Room / Location: [place] [address] / [facility]    Anesthesia Start: 1111 Anesthesia Stop:     Procedures:       BRONCHOSCOPY COMPUTER ASSISTED      BRONCHOSCOPY ENDOBRONCHIAL ULTRASOUND Diagnosis:       Pulmonary nodule      (Pulmonary nodule [R91.1])    Surgeons: Roni Ríos MD Responsible Provider: Yary Jhaveri MD    Anesthesia Type: general ASA Status: 2            Anesthesia Type: No value filed.    Cristo Phase I:      Cristo Phase II:      Anesthesia Post Evaluation    Patient location during evaluation: PACU  Patient participation: complete - patient participated  Level of consciousness: awake  Pain score: 4  Airway patency: patent  Nausea & Vomiting: no nausea and no vomiting  Cardiovascular status: hemodynamically stable  Respiratory status: acceptable  Hydration status: euvolemic  Pain management: adequate    No notable events documented.

## 2025-06-30 NOTE — PROGRESS NOTES
Pt admitted to pre op, pre op teaching complete, questions encouraged and answered. Assessment complete, VSS.

## 2025-06-30 NOTE — PROGRESS NOTES
Reviewed patient's medical and surgical history in electronic record and with patient at the bedside. All questions regarding procedure answered.     Scope number and equipment verified using a two person system. Family in waiting room.    Electronically signed by Stefani Conroy RN on 6/30/2025 at 11:21 AM

## 2025-06-30 NOTE — ADDENDUM NOTE
Addendum  created 06/30/25 1310 by Catherine Weiner APRN - CRNA    Flowsheet accepted, Intraprocedure Flowsheets edited

## 2025-06-30 NOTE — PROGRESS NOTES
Upon discharging the patient, the patient's family asked how the xray looked that Dr. Ríos ordered.  This RN stated that there was no xray ordered, but would follow up with Dr. Ríos to see if an xray was needed. Maria De Jesus Johnston RN reached out to Dr. Ríos, and per Dr. Ríos, an xray is needed but he forgot to put the order in.  Maria De Jesus QUICK put the order in and patient is now awaiting xray.

## 2025-06-30 NOTE — ANESTHESIA PRE PROCEDURE
Department of Anesthesiology  Preprocedure Note       Name:  Doris Alberto   Age:  70 y.o.  :  1955                                          MRN:  [id number]         Date:  2025      Surgeon: Surgeon(s):  Roni Ríos MD    Procedure: Procedure(s):  BRONCHOSCOPY COMPUTER ASSISTED  BRONCHOSCOPY ENDOBRONCHIAL ULTRASOUND    Medications prior to admission:   Prior to Admission medications    Medication Sig Start Date End Date Taking? Authorizing Provider   amLODIPine (NORVASC) 2.5 MG tablet Take 1 tablet by mouth daily   Yes Aretha Soriano MD   Vitamin D-Vitamin K (D3 + K2 DOTS PO) Take by mouth daily (with breakfast)   Yes Aretha Soriano MD   Zinc 10 MG LOZG Take by mouth daily (with breakfast)   Yes Aretha Soriano MD   TURMERIC-FISH OIL PO Take by mouth daily (with breakfast)   Yes Aretha Soriano MD   magnesium 30 MG tablet Take 1 tablet by mouth nightly   Yes Aretha Soriano MD   Multiple Vitamins-Minerals (THERAPEUTIC MULTIVITAMIN-MINERALS) tablet Take 1 tablet by mouth daily   Yes Aretha Soriano MD   Ascorbic Acid (VITAMIN C) 250 MG tablet Take 1 tablet by mouth daily   Yes Aretha Soriano MD   lisinopril (PRINIVIL;ZESTRIL) 40 MG tablet Take 1 tablet by mouth daily   Yes Aretha Soriano MD   calcium carbonate (TUMS) 500 MG chewable tablet Take 1 tablet by mouth 2 times daily  Patient not taking: Reported on 2025    Aretha Soriano MD   venlafaxine (EFFEXOR-XR) 150 MG XR capsule Take 75 mg by mouth nightly   Patient taking differently: Take 50 mg by mouth nightly    Aretha Soriano MD       Current medications:    Current Facility-Administered Medications   Medication Dose Route Frequency Provider Last Rate Last Admin    0.9 % sodium chloride infusion   IntraVENous Continuous Roni Ríos MD           Allergies:    Allergies   Allergen Reactions    Amoxicillin-Pot Clavulanate Diarrhea     Can take keflex

## 2025-06-30 NOTE — DISCHARGE INSTRUCTIONS
BRONCHOSCOPY DISCHARGE INSTRUCTIONS    Return to the ER for painful, persistent shortness of breath.    Do not drive, operate machinery, sign important papers or drink alcohol for 24 hours due to the sedation you received for the procedure.    You may resume your usual diet and medications.     ENDOSCOPY DISCHARGE INSTRUCTIONS    You may experience some lightheadedness for the next several hours.  Plan on quiet relaxation for the rest of today.  A responsible adult needs to stay with you today.  Because of the medications you received today-do not drive,operate machinery,or sign any contractual agreement for the next 24 hours.  Do not drink any alcoholic beverages or take any unprescribed medications tonight.  Eat bland food and avoid anything greasy or spicy initially-progress to your normal diet gradually.  Diet restrictions as instructed.  You may resume home medications as instructed.  If you have any of the following problems, notify your physician or return to the hospital emergency room : fever, chills, excessive bleeding, excessive vomiting, difficulty swallowing, uncontrolled pain, increased abdominal distention, shortness of breath or any other problems.  If you have a sore throat, you may use lozenges or salt water gargles.  If you had a bronchoscopy and you experience sudden or continued shortness of breath, chest pain, spitting up or vomiting blood, notify your physician or return to the hospital emergency room.       ANESTHESIA DISCHARGE INSTRUCTIONS    Wear your seatbelt home.  You are under the influence of drugs-do not drink alcohol,drive,operate machinery,or make any important decisions or sign any legal documentsfor 24 hours  A responsible adult needs to be with you for 24 hours.  You may experience lightheadedness,dizziness,or sleepiness following surgery.  Rest at home today- increase activity as tolerated.  Progress slowly to a regular diet unless your physician has instructed you

## 2025-06-30 NOTE — H&P
The patient was examined today and the H&P from the office was reviewed. There are no changes    Past Medical History:   Diagnosis Date    Hypertension     Sleep apnea     uses cpap       Past Surgical History:   Procedure Laterality Date    COLONOSCOPY N/A 4/2/2024    COLONOSCOPY POLYPECTOMY SNARE/COLD BIOPSY performed by Michael Adan MD at [facility] ENDOSCOPY    COLONOSCOPY  4/2/2024    COLONOSCOPY BIOPSY performed by Michael Adan MD at [facility] ENDOSCOPY    JOINT REPLACEMENT      KNEE ARTHROSCOPY      right    KNEE ARTHROSCOPY Left 07/21/2020    LEFT KNEE ARTHROSCOPY, PARTIAL MEDIAL AND LATERAL MENISCECTOMY,SYNOVECTOMY, CHONDROPLASTY PATELLA performed by Hermes Allen DO at [facility] OR    ORIF FEMUR FRACTURE Right     SHOULDER ARTHROSCOPY Right 08/14/2015    WITH SUBACROMIAL DECOMPRESSION, DISTAL CLAVICLE EXCISION, ROTATOR CUFF REPAIR    WRIST SURGERY Right 09/01/2014    WRIST SURGERY Right 02/01/2015    WRIST SURGERY Left 01/01/2012       VS Reviewed  Chest Clear  Heart regular      Plan: Proceed as planned. Ion Bronchoscopy with needle biopsy and EBUS

## 2025-07-02 ENCOUNTER — TELEPHONE (OUTPATIENT)
Dept: PULMONOLOGY | Age: 70
End: 2025-07-02

## 2025-07-02 DIAGNOSIS — R91.1 PULMONARY NODULE: Primary | ICD-10-CM

## 2025-07-02 NOTE — TELEPHONE ENCOUNTER
----- Message from Salima VIDAL sent at 7/1/2025  8:58 AM EDT -----    ----- Message -----  From: Roni Ríos MD  Sent: 6/30/2025   2:32 PM EDT  To: [facility] Support; #     Put in for IR biopsy of the RML lesion  Then f/u here in two weeks

## 2025-07-02 NOTE — TELEPHONE ENCOUNTER
----- Message from Dr. Roni Ríos MD sent at 6/30/2025  2:32 PM EDT -----   Put in for IR biopsy of the RML lesion  Then f/u here in two weeks

## 2025-07-02 NOTE — TELEPHONE ENCOUNTER
Patient called asking questions.  Is the nodule still there?  Did the CXR show the nodule?  Did the nodule show up on the bronch?  Patient would like to know if a CT lung or chest can be performed before the BX is done just in case the nodule went away.

## 2025-07-03 ENCOUNTER — TELEPHONE (OUTPATIENT)
Dept: PULMONOLOGY | Age: 70
End: 2025-07-03

## 2025-07-07 ENCOUNTER — TELEPHONE (OUTPATIENT)
Dept: PULMONOLOGY | Age: 70
End: 2025-07-07

## 2025-07-07 NOTE — TELEPHONE ENCOUNTER
T[place] from radiology called and said Dr. Rust said the nodule is too small to biopsy.  Dr. Rust [phone] if you have any questions.

## 2025-07-08 ENCOUNTER — OFFICE VISIT (OUTPATIENT)
Dept: PULMONOLOGY | Age: 70
End: 2025-07-08
Payer: MEDICARE

## 2025-07-08 VITALS
OXYGEN SATURATION: 98 % | DIASTOLIC BLOOD PRESSURE: 80 MMHG | BODY MASS INDEX: 29.09 KG/M2 | HEIGHT: 66 IN | SYSTOLIC BLOOD PRESSURE: 146 MMHG | WEIGHT: 181 LBS | HEART RATE: 65 BPM

## 2025-07-08 DIAGNOSIS — R91.1 PULMONARY NODULE: Primary | ICD-10-CM

## 2025-07-08 DIAGNOSIS — J40 BRONCHITIS: ICD-10-CM

## 2025-07-08 PROCEDURE — 3017F COLORECTAL CA SCREEN DOC REV: CPT | Performed by: INTERNAL MEDICINE

## 2025-07-08 PROCEDURE — 1123F ACP DISCUSS/DSCN MKR DOCD: CPT | Performed by: INTERNAL MEDICINE

## 2025-07-08 PROCEDURE — 99213 OFFICE O/P EST LOW 20 MIN: CPT | Performed by: INTERNAL MEDICINE

## 2025-07-08 PROCEDURE — G8419 CALC BMI OUT NRM PARAM NOF/U: HCPCS | Performed by: INTERNAL MEDICINE

## 2025-07-08 PROCEDURE — 3079F DIAST BP 80-89 MM HG: CPT | Performed by: INTERNAL MEDICINE

## 2025-07-08 PROCEDURE — 3077F SYST BP >= 140 MM HG: CPT | Performed by: INTERNAL MEDICINE

## 2025-07-08 PROCEDURE — 1036F TOBACCO NON-USER: CPT | Performed by: INTERNAL MEDICINE

## 2025-07-08 PROCEDURE — G8400 PT W/DXA NO RESULTS DOC: HCPCS | Performed by: INTERNAL MEDICINE

## 2025-07-08 PROCEDURE — 1159F MED LIST DOCD IN RCRD: CPT | Performed by: INTERNAL MEDICINE

## 2025-07-08 PROCEDURE — 1090F PRES/ABSN URINE INCON ASSESS: CPT | Performed by: INTERNAL MEDICINE

## 2025-07-08 PROCEDURE — G8427 DOCREV CUR MEDS BY ELIG CLIN: HCPCS | Performed by: INTERNAL MEDICINE

## 2025-07-08 RX ORDER — DOXYCYCLINE HYCLATE 100 MG
100 TABLET ORAL 2 TIMES DAILY
Qty: 14 TABLET | Refills: 0 | Status: SHIPPED | OUTPATIENT
Start: 2025-07-08 | End: 2025-07-15

## 2025-07-08 NOTE — PROGRESS NOTES
oriented  Sclera is clear  No cervical adenopathy  No JVD.  Chest examination is clear.  Cardiac examination reveals regular rate and rhythm without murmur, gallop or rub.  The abdomen is soft, nontender and nondistended.   There is no clubbing, cyanosis or edema of the extremities.  There is no obvious skin rash.  No focal neuro deficicts  Normal mood and affect      Allergies   Allergen Reactions    Amoxicillin-Pot Clavulanate Diarrhea     Can take keflex    Hydrocodone Headaches    Celecoxib Rash    Hydrochlorothiazide Rash    Sulfa Antibiotics Rash     celebrex caused a rash     Prior to Visit Medications    Medication Sig Taking? Authorizing Provider   doxycycline hyclate (VIBRA-TABS) 100 MG tablet Take 1 tablet by mouth 2 times daily for 7 days Yes Roni Ríos MD   amLODIPine (NORVASC) 2.5 MG tablet Take 1 tablet by mouth daily Yes Aretha Soriano MD   calcium carbonate (TUMS) 500 MG chewable tablet Take 1 tablet by mouth 2 times daily Yes Aretha Soriano MD   Vitamin D-Vitamin K (D3 + K2 DOTS PO) Take by mouth daily (with breakfast) Yes Aretha Soriano MD   Zinc 10 MG LOZG Take by mouth daily (with breakfast) Yes Aretha Soriano MD   TURMERIC-FISH OIL PO Take by mouth daily (with breakfast) Yes Aretha Soriano MD   magnesium 30 MG tablet Take 1 tablet by mouth nightly Yes Aretha Soriano MD   Multiple Vitamins-Minerals (THERAPEUTIC MULTIVITAMIN-MINERALS) tablet Take 1 tablet by mouth daily Yes Aretha Soriano MD   Ascorbic Acid (VITAMIN C) 250 MG tablet Take 1 tablet by mouth daily Yes Aretha Soriano MD   lisinopril (PRINIVIL;ZESTRIL) 40 MG tablet Take 1 tablet by mouth daily Yes Aretha Soriano MD   venlafaxine (EFFEXOR-XR) 150 MG XR capsule Take 75 mg by mouth nightly   Patient taking differently: Take 50 mg by mouth nightly  Aretha Soriano MD       Vitals:    07/08/25 1228   BP: (!) 146/80   BP Site: Right Upper Arm   Patient Position:

## (undated) DEVICE — Device

## (undated) DEVICE — SYRINGE MED 50ML LUERLOCK TIP

## (undated) DEVICE — BANDAGE ADH W1XL3IN NAT FAB WVN FLX DURABLE N ADH PD SEAL

## (undated) DEVICE — SOLUTION IV IRRIG WATER 500ML POUR BRL ST 2F7113

## (undated) DEVICE — SINGLE USE BIOPSY VALVE MAJ-210: Brand: SINGLE USE BIOPSY VALVE (STERILE)

## (undated) DEVICE — Device: Brand: BALLOON

## (undated) DEVICE — SWIVEL CONNECTOR

## (undated) DEVICE — Device: Brand: DISPOSABLE ELECTROSURGICAL SNARE

## (undated) DEVICE — APPLICATOR PREP 26ML 0.7% IOD POVACRYLEX 74% ISO ALC ST

## (undated) DEVICE — Device: Brand: MEDEX

## (undated) DEVICE — BW-412T DISP COMBO CLEANING BRUSH: Brand: SINGLE USE COMBINATION CLEANING BRUSH

## (undated) DEVICE — SHEET,DRAPE,53X77,STERILE: Brand: MEDLINE

## (undated) DEVICE — MACROBORE EXTN SET W/ 1 SMRTSITE NEEDLE-FREE VLV PRT

## (undated) DEVICE — HYPODERMIC SAFETY NEEDLE: Brand: MAGELLAN

## (undated) DEVICE — FORCEPS BX L240CM WRK CHN 2.8MM STD CAP W/ NDL MIC MESH

## (undated) DEVICE — GLOVE ORANGE PI 8 1/2   MSG9085

## (undated) DEVICE — VISION PROBE ADAPTER AND SUCTION ADAPTER

## (undated) DEVICE — WEREWOLF FLOW 50 COBLATION WAND: Brand: COBLATION

## (undated) DEVICE — DYONICS 25 PATIENT TUBE SET MUST                                    BE USED WITH 7211007, 12 PER BOX

## (undated) DEVICE — Device: Brand: ION

## (undated) DEVICE — MASC TURNOVER KIT: Brand: MEDLINE INDUSTRIES, INC.

## (undated) DEVICE — ENDOSCOPIC KIT 6X3/16 FT COLON W/ 1.1 OZ 2 GWN W/O BRSH

## (undated) DEVICE — GLOVE SURG SZ 9 THK91MIL LTX FREE SYN POLYISOPRENE ANTI

## (undated) DEVICE — GOWN SIRUS NONREIN XL W/TWL: Brand: MEDLINE INDUSTRIES, INC.

## (undated) DEVICE — GAUZE,SPONGE,4"X4",8PLY,STRL,LF,10/TRAY: Brand: MEDLINE

## (undated) DEVICE — 3M™ STERI-DRAPE™ U-DRAPE 1015: Brand: STERI-DRAPE™

## (undated) DEVICE — GOWN AURORA NONREINF LG: Brand: MEDLINE INDUSTRIES, INC.

## (undated) DEVICE — SUTURE ETHLN SZ 4-0 L18IN NONABSORBABLE BLK L19MM PS-2 3/8 1667H

## (undated) DEVICE — SINGLE USE SUCTION VALVE MAJ-209: Brand: SINGLE USE SUCTION VALVE (STERILE)

## (undated) DEVICE — GOWN,AURORA,NONREINF,RAGLAN,XXL,STERILE: Brand: MEDLINE

## (undated) DEVICE — ELECTRODE PT RET AD L9FT HI MOIST COND ADH HYDRGEL CORDED

## (undated) DEVICE — DEVON TUBE HOLDER REMOVABLE TOUCH FASTEN STRAP: Brand: DEVON

## (undated) DEVICE — AIR/WATER CLEANING ADAPTER FOR OLYMPUS® GI ENDOSCOPE: Brand: BULLDOG®

## (undated) DEVICE — TRAP SPEC RETRV CLR PLAS POLYP IN LN SUCT QUIK CTCH

## (undated) DEVICE — SYRINGE, LUER LOCK, 60ML: Brand: MEDLINE

## (undated) DEVICE — SINGLE USE AIR/WATER, SUCTION AND BIOPSY VALVES SET: Brand: ORCAPOD™

## (undated) DEVICE — 3.5 MM FULL RADIUS ELITE STRAIGHT                                    DISPOSABLE BLADES, BEIGE,PACKAGED 6                                    PER BOX, STERILE

## (undated) DEVICE — CONMED CHANNEL MASTER PULMONARY AND PEDIATRIC CLEANING BRUSH, 160 CM X 2.0 MM: Brand: CONMED